# Patient Record
Sex: MALE | Race: OTHER | Employment: OTHER | URBAN - METROPOLITAN AREA
[De-identification: names, ages, dates, MRNs, and addresses within clinical notes are randomized per-mention and may not be internally consistent; named-entity substitution may affect disease eponyms.]

---

## 2017-01-04 ENCOUNTER — TELEPHONE (OUTPATIENT)
Dept: GASTROENTEROLOGY | Facility: CLINIC | Age: 72
End: 2017-01-04

## 2017-01-04 DIAGNOSIS — G47.00 INSOMNIA, UNSPECIFIED TYPE: Primary | ICD-10-CM

## 2017-01-04 RX ORDER — LORAZEPAM 2 MG/1
2 TABLET ORAL 2 TIMES DAILY PRN
Qty: 60 TABLET | Refills: 2 | Status: SHIPPED | OUTPATIENT
Start: 2017-01-04 | End: 2017-02-21

## 2017-01-04 NOTE — TELEPHONE ENCOUNTER
Vitaly Bass @ [facility] notified to cancel colon & EGD for 1/6. Removed from EMR    FYI sent to [facility].   Pt has rescheduled many times since OV 7/7/16

## 2017-01-04 NOTE — TELEPHONE ENCOUNTER
Pt. Wants to cancel his procedure. He does not want to reschedule at this time. He states that he will call back.

## 2017-01-10 DIAGNOSIS — G89.29 CHRONIC MIDLINE LOW BACK PAIN WITHOUT SCIATICA: Primary | ICD-10-CM

## 2017-01-10 DIAGNOSIS — M54.50 CHRONIC MIDLINE LOW BACK PAIN WITHOUT SCIATICA: Primary | ICD-10-CM

## 2017-01-11 RX ORDER — HYDROCODONE BITARTRATE AND ACETAMINOPHEN 5; 325 MG/1; MG/1
1 TABLET ORAL EVERY 6 HOURS PRN
Qty: 90 TABLET | Refills: 0 | Status: SHIPPED | OUTPATIENT
Start: 2017-01-11 | End: 2017-02-10

## 2017-01-26 DIAGNOSIS — Z00.00 PHYSICAL EXAM: ICD-10-CM

## 2017-01-26 DIAGNOSIS — Z00.00 ENCOUNTER FOR ANNUAL HEALTH EXAMINATION: Primary | ICD-10-CM

## 2017-01-27 RX ORDER — ASPIRIN 81 MG/1
81 TABLET, CHEWABLE ORAL DAILY
Qty: 30 TABLET | Refills: 2 | Status: SHIPPED | OUTPATIENT
Start: 2017-01-27 | End: 2018-04-13

## 2017-01-31 ENCOUNTER — TELEPHONE (OUTPATIENT)
Dept: INTERNAL MEDICINE CLINIC | Facility: CLINIC | Age: 72
End: 2017-01-31

## 2017-01-31 ENCOUNTER — OFFICE VISIT (OUTPATIENT)
Dept: INTERNAL MEDICINE CLINIC | Facility: CLINIC | Age: 72
End: 2017-01-31

## 2017-01-31 VITALS
WEIGHT: 136 LBS | DIASTOLIC BLOOD PRESSURE: 80 MMHG | HEIGHT: 63.5 IN | TEMPERATURE: 99 F | HEART RATE: 100 BPM | OXYGEN SATURATION: 98 % | BODY MASS INDEX: 23.8 KG/M2 | SYSTOLIC BLOOD PRESSURE: 126 MMHG

## 2017-01-31 DIAGNOSIS — N18.31 CHRONIC KIDNEY DISEASE (CKD) STAGE G3A/A1, MODERATELY DECREASED GLOMERULAR FILTRATION RATE (GFR) BETWEEN 45-59 ML/MIN/1.73 SQUARE METER AND ALBUMINURIA CREATININE RATIO LESS THAN 30 MG/G (HCC): ICD-10-CM

## 2017-01-31 DIAGNOSIS — M10.371 ACUTE GOUT DUE TO RENAL IMPAIRMENT INVOLVING TOE OF RIGHT FOOT: Primary | ICD-10-CM

## 2017-01-31 PROCEDURE — 99213 OFFICE O/P EST LOW 20 MIN: CPT | Performed by: FAMILY MEDICINE

## 2017-01-31 RX ORDER — PREDNISONE 20 MG/1
20 TABLET ORAL DAILY
Qty: 7 TABLET | Refills: 1 | Status: SHIPPED | OUTPATIENT
Start: 2017-01-31 | End: 2017-02-07

## 2017-01-31 RX ORDER — HYDROCODONE BITARTRATE AND ACETAMINOPHEN 7.5; 325 MG/1; MG/1
1 TABLET ORAL EVERY 6 HOURS PRN
Qty: 30 TABLET | Refills: 1 | Status: SHIPPED | OUTPATIENT
Start: 2017-01-31 | End: 2017-02-20

## 2017-01-31 RX ORDER — COLCHICINE 0.6 MG/1
0.6 TABLET ORAL 2 TIMES DAILY
Qty: 14 TABLET | Refills: 1 | Status: SHIPPED | OUTPATIENT
Start: 2017-01-31 | End: 2017-02-07

## 2017-01-31 NOTE — PROGRESS NOTES
CC:  Gout      Hx of CC:  3 DAYS WITH SEVERE RIGHT TOE/ANTERIOR FOOT PAIN.   GETS \"GOUTY\" ATTACKS ABOUT EVERY 8 MONTHS OR SO.    Vitals:    01/31/17  1148   BP: 126/80   Pulse: 100   Temp: 98.6 °F (37 °C)   TempSrc: Oral   Height: 63.5\"   Weight: 136 lb

## 2017-01-31 NOTE — PATIENT INSTRUCTIONS
TAKE PRESCRIBED MEDS X 1 WEEK.   IN THE FUTURE CONSIDER ALLOPURINOL/COLCHICINE DAILY TO LOWER URIC ACID LEVELS

## 2017-01-31 NOTE — TELEPHONE ENCOUNTER
Pt would like a refill on his LORazepam (ATIVAN) 2 MG Oral Tab. Pt says he is experiencing swelling and pain from his gout and would also like a prescription. Preferred contact is [phone]. Preferred pharmacy is [address].  [employer]

## 2017-02-09 ENCOUNTER — OFFICE VISIT (OUTPATIENT)
Dept: PODIATRY CLINIC | Facility: CLINIC | Age: 72
End: 2017-02-09

## 2017-02-09 DIAGNOSIS — B35.1 ONYCHOMYCOSIS: ICD-10-CM

## 2017-02-09 DIAGNOSIS — M79.675 PAIN IN TOES OF BOTH FEET: Primary | ICD-10-CM

## 2017-02-09 DIAGNOSIS — M79.674 PAIN IN TOES OF BOTH FEET: Primary | ICD-10-CM

## 2017-02-09 PROCEDURE — G0463 HOSPITAL OUTPT CLINIC VISIT: HCPCS | Performed by: PODIATRIST

## 2017-02-09 PROCEDURE — 99212 OFFICE O/P EST SF 10 MIN: CPT | Performed by: PODIATRIST

## 2017-02-09 NOTE — PROGRESS NOTES
HPI:    Patient ID: Kait Chopra is a 74year old male. HPI  This pleasant 77-year-old male presents with a new complaint. His concern is associated with both of his great toenails.   He states that they have been thick and hard to manage and caus are normal.  All lesser toenails appear to be quite normal.  There is minor dystrophy but properly cared for the right great toenail has marked thickness and dystrophy changes that are clearly associated with long-standing chronic mycosis.   There is palpab

## 2017-02-15 DIAGNOSIS — M10.371 ACUTE GOUT DUE TO RENAL IMPAIRMENT INVOLVING TOE OF RIGHT FOOT: Primary | ICD-10-CM

## 2017-02-15 RX ORDER — HYDROCODONE BITARTRATE AND ACETAMINOPHEN 7.5; 325 MG/1; MG/1
1 TABLET ORAL EVERY 6 HOURS PRN
Qty: 30 TABLET | Refills: 1 | OUTPATIENT
Start: 2017-02-15

## 2017-02-15 NOTE — TELEPHONE ENCOUNTER
Can you please see if he is out of norco already? Was given 30 on 1/31/17 and one refill?  Usually doesn't go through it this fast. TY

## 2017-02-17 NOTE — TELEPHONE ENCOUNTER
Patient walked in to  script I informed script was not approved because he just received 30 tablets on 01/31/2017.  Per patient he was taking medication as prescribed every 6 hours states he was taking 3 a day because of his gout pain and is now out

## 2017-02-20 DIAGNOSIS — M10.371 ACUTE GOUT DUE TO RENAL IMPAIRMENT INVOLVING TOE OF RIGHT FOOT: Primary | ICD-10-CM

## 2017-02-21 ENCOUNTER — OFFICE VISIT (OUTPATIENT)
Dept: INTERNAL MEDICINE CLINIC | Facility: CLINIC | Age: 72
End: 2017-02-21

## 2017-02-21 ENCOUNTER — TELEPHONE (OUTPATIENT)
Dept: INTERNAL MEDICINE CLINIC | Facility: CLINIC | Age: 72
End: 2017-02-21

## 2017-02-21 ENCOUNTER — TELEPHONE (OUTPATIENT)
Dept: PODIATRY CLINIC | Facility: CLINIC | Age: 72
End: 2017-02-21

## 2017-02-21 VITALS
RESPIRATION RATE: 12 BRPM | HEART RATE: 65 BPM | OXYGEN SATURATION: 97 % | TEMPERATURE: 98 F | WEIGHT: 138 LBS | DIASTOLIC BLOOD PRESSURE: 80 MMHG | SYSTOLIC BLOOD PRESSURE: 136 MMHG | BODY MASS INDEX: 24.15 KG/M2 | HEIGHT: 63.5 IN

## 2017-02-21 DIAGNOSIS — I10 ESSENTIAL HYPERTENSION WITH GOAL BLOOD PRESSURE LESS THAN 140/90: Primary | ICD-10-CM

## 2017-02-21 DIAGNOSIS — G47.00 INSOMNIA, UNSPECIFIED TYPE: ICD-10-CM

## 2017-02-21 DIAGNOSIS — Z12.11 SCREENING FOR COLON CANCER: ICD-10-CM

## 2017-02-21 DIAGNOSIS — M54.50 CHRONIC MIDLINE LOW BACK PAIN WITHOUT SCIATICA: ICD-10-CM

## 2017-02-21 DIAGNOSIS — I10 ESSENTIAL HYPERTENSION: ICD-10-CM

## 2017-02-21 DIAGNOSIS — F41.9 ANXIETY: ICD-10-CM

## 2017-02-21 DIAGNOSIS — G89.29 CHRONIC MIDLINE LOW BACK PAIN WITHOUT SCIATICA: ICD-10-CM

## 2017-02-21 DIAGNOSIS — E03.9 HYPOTHYROIDISM, UNSPECIFIED TYPE: Primary | ICD-10-CM

## 2017-02-21 DIAGNOSIS — M54.2 NECK PAIN: ICD-10-CM

## 2017-02-21 LAB
ALBUMIN SERPL BCP-MCNC: 4.1 G/DL (ref 3.5–4.8)
ALBUMIN/GLOB SERPL: 1.5 {RATIO} (ref 1–2)
ALP SERPL-CCNC: 79 U/L (ref 32–100)
ALT SERPL-CCNC: 18 U/L (ref 17–63)
ANION GAP SERPL CALC-SCNC: 11 MMOL/L (ref 0–18)
AST SERPL-CCNC: 27 U/L (ref 15–41)
BILIRUB SERPL-MCNC: 1.1 MG/DL (ref 0.3–1.2)
BUN SERPL-MCNC: 14 MG/DL (ref 8–20)
BUN/CREAT SERPL: 10.9 (ref 10–20)
CALCIUM SERPL-MCNC: 9.5 MG/DL (ref 8.5–10.5)
CHLORIDE SERPL-SCNC: 101 MMOL/L (ref 95–110)
CO2 SERPL-SCNC: 25 MMOL/L (ref 22–32)
CREAT SERPL-MCNC: 1.28 MG/DL (ref 0.5–1.5)
GLOBULIN PLAS-MCNC: 2.7 G/DL (ref 2.5–3.7)
GLUCOSE SERPL-MCNC: 76 MG/DL (ref 70–99)
OSMOLALITY UR CALC.SUM OF ELEC: 283 MOSM/KG (ref 275–295)
POTASSIUM SERPL-SCNC: 4.4 MMOL/L (ref 3.3–5.1)
PROT SERPL-MCNC: 6.8 G/DL (ref 5.9–8.4)
SODIUM SERPL-SCNC: 137 MMOL/L (ref 136–144)
TSH SERPL-ACNC: 0.98 UIU/ML (ref 0.34–5.6)

## 2017-02-21 PROCEDURE — 99214 OFFICE O/P EST MOD 30 MIN: CPT | Performed by: FAMILY MEDICINE

## 2017-02-21 PROCEDURE — 84443 ASSAY THYROID STIM HORMONE: CPT | Performed by: FAMILY MEDICINE

## 2017-02-21 PROCEDURE — 80053 COMPREHEN METABOLIC PANEL: CPT | Performed by: FAMILY MEDICINE

## 2017-02-21 RX ORDER — METOPROLOL SUCCINATE 50 MG/1
50 TABLET, EXTENDED RELEASE ORAL DAILY
Qty: 90 TABLET | Refills: 1 | Status: SHIPPED | OUTPATIENT
Start: 2017-02-21 | End: 2017-03-07

## 2017-02-21 RX ORDER — HYDROCODONE BITARTRATE AND ACETAMINOPHEN 7.5; 325 MG/1; MG/1
1 TABLET ORAL EVERY 6 HOURS PRN
Qty: 60 TABLET | Refills: 1 | Status: SHIPPED | OUTPATIENT
Start: 2017-02-21 | End: 2017-03-16

## 2017-02-21 RX ORDER — METOPROLOL SUCCINATE 50 MG/1
50 TABLET, EXTENDED RELEASE ORAL DAILY
Qty: 90 TABLET | Refills: 1 | Status: CANCELLED | OUTPATIENT
Start: 2017-02-21

## 2017-02-21 RX ORDER — LISINOPRIL 20 MG/1
20 TABLET ORAL DAILY
Qty: 90 TABLET | Refills: 1 | Status: SHIPPED | OUTPATIENT
Start: 2017-02-21 | End: 2017-04-24

## 2017-02-21 RX ORDER — LORAZEPAM 2 MG/1
2 TABLET ORAL 2 TIMES DAILY PRN
Qty: 60 TABLET | Refills: 2 | Status: SHIPPED | OUTPATIENT
Start: 2017-02-21 | End: 2017-05-15 | Stop reason: ALTCHOICE

## 2017-02-21 RX ORDER — LORAZEPAM 2 MG/1
2 TABLET ORAL 2 TIMES DAILY PRN
Qty: 60 TABLET | Refills: 2 | Status: CANCELLED | OUTPATIENT
Start: 2017-02-21

## 2017-02-21 RX ORDER — LISINOPRIL 20 MG/1
20 TABLET ORAL DAILY
Qty: 90 TABLET | Refills: 1 | Status: CANCELLED | OUTPATIENT
Start: 2017-02-21
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
## 2017-02-21 NOTE — PATIENT INSTRUCTIONS
Would like you to get colonoscopy    Tylenol for back pain and norco only if needed    Start zoloft- 1/2 tablet for one week, then one tablet daily.  Can take at night or morning    Will also get MRI of back      Follow up 1 week

## 2017-02-27 ENCOUNTER — OFFICE VISIT (OUTPATIENT)
Dept: PODIATRY CLINIC | Facility: CLINIC | Age: 72
End: 2017-02-27

## 2017-02-27 DIAGNOSIS — M79.675 PAIN IN TOES OF BOTH FEET: ICD-10-CM

## 2017-02-27 DIAGNOSIS — M79.674 PAIN IN TOES OF BOTH FEET: ICD-10-CM

## 2017-02-27 DIAGNOSIS — B35.1 ONYCHOMYCOSIS: Primary | ICD-10-CM

## 2017-02-27 PROCEDURE — 11750 EXCISION NAIL&NAIL MATRIX: CPT | Performed by: PODIATRIST

## 2017-02-27 NOTE — PROGRESS NOTES
HPI:    Patient ID: Maritza Newman is a 74year old male. HPI  64-year-old male presents for nail removal right great toe. I reviewed the procedure the postoperative course the potential concerns and complications.   This is a total nail removal on instructions         ASSESSMENT/PLAN:   Onychomycosis  (primary encounter diagnosis)  Pain in toes of both feet    No orders of the defined types were placed in this encounter.        Meds This Visit:  No prescriptions requested or ordered in this encounter

## 2017-02-27 NOTE — PROGRESS NOTES
Per verbal order from [facility], draw up 1.5ml of 0.5% Marcaine and 1.5ml of 2% lidocaine for injection to right great toe nail .  Consent signed for release/removal right great toenail Leonor Grady RN

## 2017-03-07 ENCOUNTER — OFFICE VISIT (OUTPATIENT)
Dept: PODIATRY CLINIC | Facility: CLINIC | Age: 72
End: 2017-03-07

## 2017-03-07 DIAGNOSIS — B35.1 ONYCHOMYCOSIS: Primary | ICD-10-CM

## 2017-03-07 PROCEDURE — G0463 HOSPITAL OUTPT CLINIC VISIT: HCPCS | Performed by: PODIATRIST

## 2017-03-07 PROCEDURE — 99024 POSTOP FOLLOW-UP VISIT: CPT | Performed by: PODIATRIST

## 2017-03-15 ENCOUNTER — HOSPITAL ENCOUNTER (OUTPATIENT)
Dept: MRI IMAGING | Facility: HOSPITAL | Age: 72
Discharge: HOME | End: 2017-03-15
Attending: FAMILY MEDICINE
Payer: MEDICARE

## 2017-03-15 DIAGNOSIS — G89.29 CHRONIC MIDLINE LOW BACK PAIN WITHOUT SCIATICA: ICD-10-CM

## 2017-03-15 DIAGNOSIS — M54.50 CHRONIC MIDLINE LOW BACK PAIN WITHOUT SCIATICA: ICD-10-CM

## 2017-03-15 PROCEDURE — 72148 MRI LUMBAR SPINE W/O DYE: CPT

## 2017-03-15 PROCEDURE — 72158 MRI LUMBAR SPINE W/O & W/DYE: CPT

## 2017-03-16 ENCOUNTER — TELEPHONE (OUTPATIENT)
Dept: INTERNAL MEDICINE CLINIC | Facility: CLINIC | Age: 72
End: 2017-03-16

## 2017-03-17 NOTE — PROGRESS NOTES
Quick Note:    D/w pt over phone. Recommend PT. Is taking norco for pain prn. Discussed physiatry referral today but pt prefers to wait.  Will readdress at next visit.  ______

## 2017-03-21 DIAGNOSIS — G47.00 INSOMNIA, UNSPECIFIED TYPE: Primary | ICD-10-CM

## 2017-03-22 ENCOUNTER — OFFICE VISIT (OUTPATIENT)
Dept: INTERNAL MEDICINE CLINIC | Facility: CLINIC | Age: 72
End: 2017-03-22

## 2017-03-22 ENCOUNTER — OFFICE VISIT (OUTPATIENT)
Dept: PODIATRY CLINIC | Facility: CLINIC | Age: 72
End: 2017-03-22

## 2017-03-22 VITALS
HEIGHT: 63.5 IN | BODY MASS INDEX: 25.2 KG/M2 | SYSTOLIC BLOOD PRESSURE: 126 MMHG | HEART RATE: 74 BPM | WEIGHT: 144 LBS | RESPIRATION RATE: 16 BRPM | OXYGEN SATURATION: 97 % | DIASTOLIC BLOOD PRESSURE: 80 MMHG

## 2017-03-22 DIAGNOSIS — M51.26 HERNIATED INTERVERTEBRAL DISC OF LUMBAR SPINE: Primary | ICD-10-CM

## 2017-03-22 DIAGNOSIS — M54.42 CHRONIC LEFT-SIDED LOW BACK PAIN WITH LEFT-SIDED SCIATICA: ICD-10-CM

## 2017-03-22 DIAGNOSIS — G89.29 CHRONIC LEFT-SIDED LOW BACK PAIN WITH LEFT-SIDED SCIATICA: ICD-10-CM

## 2017-03-22 DIAGNOSIS — K13.0 ANGULAR CHEILITIS: ICD-10-CM

## 2017-03-22 DIAGNOSIS — B35.1 ONYCHOMYCOSIS: Primary | ICD-10-CM

## 2017-03-22 DIAGNOSIS — F41.9 ANXIETY: ICD-10-CM

## 2017-03-22 PROCEDURE — 99212 OFFICE O/P EST SF 10 MIN: CPT | Performed by: PODIATRIST

## 2017-03-22 PROCEDURE — 99213 OFFICE O/P EST LOW 20 MIN: CPT | Performed by: FAMILY MEDICINE

## 2017-03-22 PROCEDURE — G0463 HOSPITAL OUTPT CLINIC VISIT: HCPCS | Performed by: PODIATRIST

## 2017-03-22 RX ORDER — LORAZEPAM 2 MG/1
1 TABLET ORAL 2 TIMES DAILY PRN
Qty: 60 TABLET | Refills: 1 | Status: SHIPPED | OUTPATIENT
Start: 2017-03-22 | End: 2017-05-15 | Stop reason: ALTCHOICE

## 2017-03-22 NOTE — PROGRESS NOTES
HPI:    Patient ID: Belia Bond is a 74year old male. HPI  This 40-year-old male presents for follow-up in reference to right great toenail removal.  A total onycho plasty was performed more than 3 weeks ago.   Patient reports no noted complaints evidence of edema nor erythema there is no odor. Minor debridement was accomplished today and I am pleased with the present status. I encourage continued daily soaking and cleansing and application of a Band-Aid.   He tells me he is soaking with vinegar a

## 2017-03-22 NOTE — PROGRESS NOTES
CC:  Test Results      Hx of CC:    Here for results MRI LS. Pt has had chronic back pain. Worse on and off depending on how much he is working. Works as  and cleans gutters. Very active.   MRI shows L3-L4 disc bulge with nerve irritation on le Grandfather    • Kevin Carbone Paternal Grandmother    • Other[other] [OTHER] Paternal Grandfather         Relation Status Death Age Comments   Fa      Mo      MGMA      MGFA      [address] [facility]      PGFA  cheilitis  vasoline daily, increase fluids  - Clotrimazole 1 % External Ointment; Apply 1 Application topically 2 (two) times daily as needed.   Dispense: 1 Tube; Refill: 0  - triamcinolone acetonide 0.1 % External Cream; Apply topically 2 (two) times daily

## 2017-03-23 RX ORDER — LORAZEPAM 2 MG/1
2 TABLET ORAL 2 TIMES DAILY PRN
Qty: 60 TABLET | Refills: 0 | OUTPATIENT
Start: 2017-03-23

## 2017-04-17 ENCOUNTER — OFFICE VISIT (OUTPATIENT)
Dept: INTERNAL MEDICINE CLINIC | Facility: CLINIC | Age: 72
End: 2017-04-17

## 2017-04-17 VITALS
SYSTOLIC BLOOD PRESSURE: 128 MMHG | TEMPERATURE: 98 F | DIASTOLIC BLOOD PRESSURE: 78 MMHG | BODY MASS INDEX: 25 KG/M2 | WEIGHT: 142.81 LBS | HEART RATE: 75 BPM | OXYGEN SATURATION: 99 %

## 2017-04-17 DIAGNOSIS — G89.29 CHRONIC LEFT-SIDED LOW BACK PAIN WITH LEFT-SIDED SCIATICA: ICD-10-CM

## 2017-04-17 DIAGNOSIS — M54.42 CHRONIC LEFT-SIDED LOW BACK PAIN WITH LEFT-SIDED SCIATICA: ICD-10-CM

## 2017-04-17 DIAGNOSIS — T14.8XXA BRUISE: Primary | ICD-10-CM

## 2017-04-17 DIAGNOSIS — G47.00 INSOMNIA, UNSPECIFIED TYPE: ICD-10-CM

## 2017-04-17 PROCEDURE — 99214 OFFICE O/P EST MOD 30 MIN: CPT | Performed by: FAMILY MEDICINE

## 2017-04-17 PROCEDURE — 85025 COMPLETE CBC W/AUTO DIFF WBC: CPT | Performed by: FAMILY MEDICINE

## 2017-04-17 RX ORDER — LORAZEPAM 2 MG/1
2 TABLET ORAL 2 TIMES DAILY PRN
Qty: 60 TABLET | Refills: 2 | Status: SHIPPED | OUTPATIENT
Start: 2017-04-17 | End: 2017-08-03

## 2017-04-17 RX ORDER — HYDROCODONE BITARTRATE AND ACETAMINOPHEN 7.5; 325 MG/1; MG/1
1 TABLET ORAL EVERY 6 HOURS PRN
Qty: 90 TABLET | Refills: 0 | Status: SHIPPED | OUTPATIENT
Start: 2017-04-17 | End: 2017-05-17
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
## 2017-04-20 ENCOUNTER — TELEPHONE (OUTPATIENT)
Dept: GASTROENTEROLOGY | Facility: CLINIC | Age: 72
End: 2017-04-20

## 2017-04-20 NOTE — TELEPHONE ENCOUNTER
Pt had consultation 7/7/16 for colonoscopy- would like to scheduled procedure- pl call pt- pt st he is not having any GI problems

## 2017-04-20 NOTE — TELEPHONE ENCOUNTER
Pt had rescheduled 4-5 times since July. Please call pt to reschedule screening colonoscopy.   Routed to [place],     Venkat Chavez RN at 7/7/2016 10:28 AM      Status: Signed : Venkat Chavez RN (Registered Nurse)     Expand All Collapse

## 2017-04-24 RX ORDER — LISINOPRIL 20 MG/1
TABLET ORAL
Qty: 90 TABLET | Refills: 0 | Status: SHIPPED | OUTPATIENT
Start: 2017-04-24 | End: 2017-04-25

## 2017-04-24 RX ORDER — METOPROLOL SUCCINATE 50 MG/1
TABLET, EXTENDED RELEASE ORAL
Qty: 90 TABLET | Refills: 0 | Status: SHIPPED | OUTPATIENT
Start: 2017-04-24 | End: 2017-09-21

## 2017-04-24 RX ORDER — LISINOPRIL 20 MG/1
TABLET ORAL
Qty: 90 TABLET | Refills: 0 | Status: SHIPPED | OUTPATIENT
Start: 2017-04-24 | End: 2017-09-21

## 2017-04-24 RX ORDER — LEVOTHYROXINE SODIUM 137 UG/1
TABLET ORAL
Qty: 30 TABLET | Refills: 4 | Status: SHIPPED | OUTPATIENT
Start: 2017-04-24 | End: 2017-09-20

## 2017-04-25 RX ORDER — LISINOPRIL 20 MG/1
TABLET ORAL
Qty: 90 TABLET | Refills: 0 | Status: SHIPPED | OUTPATIENT
Start: 2017-04-25 | End: 2017-05-15 | Stop reason: ALTCHOICE

## 2017-04-26 NOTE — TELEPHONE ENCOUNTER
Scheduled for:  Colonoscopy [phone] and EGD [id number]  Provider Name:  Dr. Summer Mendez  Date:  5/23/17  Location:  [facility]  Sedation:  IV  Time:  1400 (pt aware to arrive at 1300)  Prep:  Miralax/Gatorade, mailed 4/26/17   Meds/Allergies Reconciled?:  Yes  Diagnosis with cod

## 2017-05-15 ENCOUNTER — OFFICE VISIT (OUTPATIENT)
Dept: INTERNAL MEDICINE CLINIC | Facility: CLINIC | Age: 72
End: 2017-05-15

## 2017-05-15 VITALS
SYSTOLIC BLOOD PRESSURE: 122 MMHG | DIASTOLIC BLOOD PRESSURE: 80 MMHG | HEART RATE: 72 BPM | BODY MASS INDEX: 25.2 KG/M2 | HEIGHT: 63.5 IN | OXYGEN SATURATION: 97 % | WEIGHT: 144 LBS

## 2017-05-15 DIAGNOSIS — F41.9 ANXIETY: ICD-10-CM

## 2017-05-15 DIAGNOSIS — S29.011A CHEST WALL MUSCLE STRAIN, INITIAL ENCOUNTER: ICD-10-CM

## 2017-05-15 DIAGNOSIS — M54.50 ACUTE LOW BACK PAIN WITHOUT SCIATICA, UNSPECIFIED BACK PAIN LATERALITY: ICD-10-CM

## 2017-05-15 DIAGNOSIS — M51.26 HERNIATED LUMBAR INTERVERTEBRAL DISC: ICD-10-CM

## 2017-05-15 DIAGNOSIS — R07.9 CHEST PAIN, UNSPECIFIED TYPE: Primary | ICD-10-CM

## 2017-05-15 PROCEDURE — 99214 OFFICE O/P EST MOD 30 MIN: CPT | Performed by: FAMILY MEDICINE

## 2017-05-15 PROCEDURE — 93000 ELECTROCARDIOGRAM COMPLETE: CPT | Performed by: FAMILY MEDICINE

## 2017-05-15 RX ORDER — HYDROCODONE BITARTRATE AND ACETAMINOPHEN 7.5; 325 MG/1; MG/1
1 TABLET ORAL EVERY 8 HOURS PRN
Qty: 90 TABLET | Refills: 0 | Status: SHIPPED | OUTPATIENT
Start: 2017-05-15 | End: 2017-06-12

## 2017-05-15 RX ORDER — IBUPROFEN 600 MG/1
600 TABLET ORAL EVERY 6 HOURS PRN
Qty: 30 TABLET | Refills: 1 | Status: SHIPPED | OUTPATIENT
Start: 2017-05-15 | End: 2017-09-21

## 2017-05-15 NOTE — PROGRESS NOTES
Adriana Cordova is a 74year old male. CC:  Patient presents with:  Chest Pressure: Pt presents to clinic for c/o right sided chest pressure with movement-->pt states pain started after lifting heavy supplies.         HPI:    Pt c/o right sided chest Unspecified essential hypertension    • Allergic rhinitis    • Hypothyroid    • Alopecia areata    • Esophageal reflux       No past surgical history on file.    Family History   Problem Relation Age of Onset   • other[other] [OTHER] Father      unknown cau Alert and oriented x 3, affect appropriate  SKIN: No rashes, no lesions  EXTREMITIES:  Bilaterally warm, no edema, no rashes  LYMPH:  No cervical lymphadenopathy  MUSCULOSKELETAL: Normal ambulation and movement  NEURO: A & O x 3, no focal deficits    EKG:

## 2017-06-12 DIAGNOSIS — M54.50 ACUTE LOW BACK PAIN WITHOUT SCIATICA, UNSPECIFIED BACK PAIN LATERALITY: Primary | ICD-10-CM

## 2017-06-12 RX ORDER — HYDROCODONE BITARTRATE AND ACETAMINOPHEN 7.5; 325 MG/1; MG/1
1 TABLET ORAL EVERY 8 HOURS PRN
Qty: 90 TABLET | Refills: 0 | Status: SHIPPED | OUTPATIENT
Start: 2017-06-12 | End: 2017-07-06

## 2017-06-22 ENCOUNTER — OFFICE VISIT (OUTPATIENT)
Dept: INTERNAL MEDICINE CLINIC | Facility: CLINIC | Age: 72
End: 2017-06-22

## 2017-06-22 VITALS
SYSTOLIC BLOOD PRESSURE: 144 MMHG | DIASTOLIC BLOOD PRESSURE: 86 MMHG | OXYGEN SATURATION: 97 % | WEIGHT: 147 LBS | BODY MASS INDEX: 25.72 KG/M2 | HEART RATE: 61 BPM | RESPIRATION RATE: 17 BRPM | HEIGHT: 63.5 IN

## 2017-06-22 DIAGNOSIS — S46.212A BICEPS STRAIN, LEFT, INITIAL ENCOUNTER: ICD-10-CM

## 2017-06-22 DIAGNOSIS — I10 ESSENTIAL HYPERTENSION: Primary | ICD-10-CM

## 2017-06-22 DIAGNOSIS — F41.9 ANXIETY: ICD-10-CM

## 2017-06-22 DIAGNOSIS — M54.50 ACUTE LEFT-SIDED LOW BACK PAIN WITHOUT SCIATICA: ICD-10-CM

## 2017-06-22 DIAGNOSIS — D64.9 ANEMIA, UNSPECIFIED TYPE: ICD-10-CM

## 2017-06-22 DIAGNOSIS — M25.512 ACUTE PAIN OF LEFT SHOULDER: ICD-10-CM

## 2017-06-22 PROCEDURE — 99214 OFFICE O/P EST MOD 30 MIN: CPT | Performed by: FAMILY MEDICINE

## 2017-06-22 RX ORDER — LISINOPRIL AND HYDROCHLOROTHIAZIDE 25; 20 MG/1; MG/1
1 TABLET ORAL DAILY
Qty: 30 TABLET | Refills: 1 | Status: SHIPPED | OUTPATIENT
Start: 2017-06-22 | End: 2017-08-25

## 2017-06-22 RX ORDER — NAPROXEN 500 MG/1
500 TABLET ORAL 2 TIMES DAILY WITH MEALS
Qty: 30 TABLET | Refills: 1 | Status: SHIPPED | OUTPATIENT
Start: 2017-06-22 | End: 2017-09-21

## 2017-06-22 NOTE — PATIENT INSTRUCTIONS
Please sched physical therapy and colonoscopy    Break from lifting weights x 1 month , do therapy instead    Follow up 1-2 mos , return for full physical and BP recheck    Low salt diet

## 2017-06-22 NOTE — PROGRESS NOTES
Sowmya Zuniga is a 74year old male. CC:  Patient presents with: Follow - Up: Pt presents to clinic for 2 month f/up and review of meds. Pt also c/o of mild left arm/shoulder pain. Other: Depression screen and fassl risk done.  Will schedule for p tablet Rfl: 2        History:  Past Medical History   Diagnosis Date   • Unspecified essential hypertension    • Allergic rhinitis    • Hypothyroid    • Alopecia areata    • Esophageal reflux       No past surgical history on file.    Family History   Probl guarding.   PSYCH:  Alert and oriented x 3, affect appropriate  SKIN: No rashes, no lesions  EXTREMITIES:  Bilaterally warm, no edema, no rashes  LYMPH:  No cervical lymphadenopathy  MUSCULOSKELETAL: Normal ambulation and movement left shoulder- + ttp anter

## 2017-06-24 ENCOUNTER — HOSPITAL ENCOUNTER (OUTPATIENT)
Dept: GENERAL RADIOLOGY | Facility: HOSPITAL | Age: 72
Discharge: HOME | End: 2017-06-24
Attending: FAMILY MEDICINE
Payer: MEDICARE

## 2017-06-24 DIAGNOSIS — F41.9 ANXIETY: ICD-10-CM

## 2017-06-24 DIAGNOSIS — M25.512 ACUTE PAIN OF LEFT SHOULDER: ICD-10-CM

## 2017-06-24 PROCEDURE — 73030 X-RAY EXAM OF SHOULDER: CPT | Performed by: FAMILY MEDICINE

## 2017-07-03 ENCOUNTER — OFFICE VISIT (OUTPATIENT)
Dept: PHYSICAL THERAPY | Facility: HOSPITAL | Age: 72
End: 2017-07-03
Attending: FAMILY MEDICINE
Payer: MEDICARE

## 2017-07-03 DIAGNOSIS — M25.512 ACUTE PAIN OF LEFT SHOULDER: ICD-10-CM

## 2017-07-03 DIAGNOSIS — S46.212A BICEPS STRAIN, LEFT, INITIAL ENCOUNTER: ICD-10-CM

## 2017-07-03 PROCEDURE — 97162 PT EVAL MOD COMPLEX 30 MIN: CPT

## 2017-07-03 PROCEDURE — 97110 THERAPEUTIC EXERCISES: CPT

## 2017-07-03 NOTE — PROGRESS NOTES
SHOULDER EVALUATION:   Referring Physician: Dr. Marta Sullivan  Diagnosis: L shoulder pain Date of Service: 7/3/2017     PATIENT SUMMARY:   Maggie Trevino is a 74year old y/o male who presents to therapy today with complaints of L shoulder pain.   Pt describes shoulders  Palpation: TTP in L bicep tendon  Sensation: intact to soft touch  Scapulohumeral Rhythm: decrease    AROM:  Shoulder    Flexion: R 150; L 150*  Abduction: R 135; L 146   Behind back reach: R=L     PROM:  Shoulder    Flexion: L 160 deg  Abductio Therapy; Therapeutic Exercises; Neuromuscular Re-education; Therapeutic Activity; Electrical Stim; Ultrasound;  Patient education; Home exercise program instruction    Education or treatment limitation: None  Rehab Potential: good    FOTO: not captured  Cur

## 2017-07-06 ENCOUNTER — OFFICE VISIT (OUTPATIENT)
Dept: PHYSICAL THERAPY | Facility: HOSPITAL | Age: 72
End: 2017-07-06
Attending: FAMILY MEDICINE
Payer: MEDICARE

## 2017-07-06 DIAGNOSIS — S46.212A BICEPS STRAIN, LEFT, INITIAL ENCOUNTER: ICD-10-CM

## 2017-07-06 DIAGNOSIS — M54.50 ACUTE LOW BACK PAIN WITHOUT SCIATICA, UNSPECIFIED BACK PAIN LATERALITY: ICD-10-CM

## 2017-07-06 DIAGNOSIS — M25.512 ACUTE PAIN OF LEFT SHOULDER: ICD-10-CM

## 2017-07-06 PROCEDURE — 97110 THERAPEUTIC EXERCISES: CPT

## 2017-07-06 NOTE — PROGRESS NOTES
Diagnosis: L shoulder pain  Authorized # of Visits:  2/10  MEDICARE       Next MD visit: none scheduled  Fall Risk: standard         Precautions: n/a           Medication Changes since last visit?: No  Subjective: \"I have pain with reaching across body, l

## 2017-07-07 ENCOUNTER — TELEPHONE (OUTPATIENT)
Dept: INTERNAL MEDICINE CLINIC | Facility: CLINIC | Age: 72
End: 2017-07-07

## 2017-07-07 RX ORDER — HYDROCODONE BITARTRATE AND ACETAMINOPHEN 7.5; 325 MG/1; MG/1
1 TABLET ORAL EVERY 8 HOURS PRN
Qty: 90 TABLET | Refills: 0 | Status: SHIPPED | OUTPATIENT
Start: 2017-07-07 | End: 2017-08-03

## 2017-07-10 ENCOUNTER — TELEPHONE (OUTPATIENT)
Dept: INTERNAL MEDICINE CLINIC | Facility: CLINIC | Age: 72
End: 2017-07-10

## 2017-07-25 ENCOUNTER — TELEPHONE (OUTPATIENT)
Dept: PHYSICAL THERAPY | Facility: HOSPITAL | Age: 72
End: 2017-07-25

## 2017-07-27 ENCOUNTER — TELEPHONE (OUTPATIENT)
Dept: PHYSICAL THERAPY | Facility: HOSPITAL | Age: 72
End: 2017-07-27

## 2017-07-30 ENCOUNTER — HOSPITAL ENCOUNTER (EMERGENCY)
Facility: HOSPITAL | Age: 72
Discharge: HOME | End: 2017-07-30
Attending: EMERGENCY MEDICINE
Payer: MEDICARE

## 2017-07-30 ENCOUNTER — APPOINTMENT (OUTPATIENT)
Dept: GENERAL RADIOLOGY | Facility: HOSPITAL | Age: 72
End: 2017-07-30
Payer: MEDICARE

## 2017-07-30 VITALS
HEIGHT: 65 IN | TEMPERATURE: 98 F | WEIGHT: 143 LBS | DIASTOLIC BLOOD PRESSURE: 71 MMHG | BODY MASS INDEX: 23.82 KG/M2 | OXYGEN SATURATION: 98 % | SYSTOLIC BLOOD PRESSURE: 120 MMHG | RESPIRATION RATE: 20 BRPM | HEART RATE: 79 BPM

## 2017-07-30 DIAGNOSIS — J40 BRONCHITIS: Primary | ICD-10-CM

## 2017-07-30 DIAGNOSIS — R91.1 LUNG NODULE: ICD-10-CM

## 2017-07-30 PROCEDURE — 71020 XR CHEST PA + LAT CHEST (CPT=71020): CPT | Performed by: EMERGENCY MEDICINE

## 2017-07-30 PROCEDURE — 99283 EMERGENCY DEPT VISIT LOW MDM: CPT

## 2017-07-30 RX ORDER — AZITHROMYCIN 250 MG/1
TABLET, FILM COATED ORAL
Qty: 1 PACKAGE | Refills: 0 | Status: SHIPPED | OUTPATIENT
Start: 2017-07-30 | End: 2017-08-03

## 2017-07-30 NOTE — ED INITIAL ASSESSMENT (HPI)
Pt states he has been coughing for 4 days now and it is beginning to get productive of greenish phlegm. He denies fever or shortness of breath. He was seen here a few years ago for the same and was prescribed a Z-pack.

## 2017-07-31 ENCOUNTER — TELEPHONE (OUTPATIENT)
Dept: PHYSICAL THERAPY | Facility: HOSPITAL | Age: 72
End: 2017-07-31

## 2017-07-31 NOTE — ED PROVIDER NOTES
Patient Seen in: [facility] Emergency Department    History   Patient presents with:  Cough      HPI    The patient presents complaining of a productive cough for the past 4 days. Initially mild, now more severe.   He denies shortness of breath, f palpitations. Gastrointestinal: Negative for abdominal pain and vomiting. Genitourinary: Negative for dysuria and hematuria. Musculoskeletal: Negative for back pain and neck pain. Skin: Negative for rash and wound.    Neurological: Negative for sync 07/30/17  1706   BP: 120/71   Pulse: 79   Resp: 20   Temp: 98 °F (36.7 °C)   TempSrc: Oral   SpO2: 98%   Weight: 64.9 kg   Height: 165.1 cm (5' 5\")     *I personally reviewed and interpreted all ED vitals.     Pulse Ox: 98%, Room air, Normal     Differenti these medications    azithromycin (ZITHROMAX Z-DENICE) 250 MG Oral Tab  500 mg once followed by 250 mg daily x 4 days, Script printed, Disp-1 Package, R-0

## 2017-08-02 ENCOUNTER — APPOINTMENT (OUTPATIENT)
Dept: PHYSICAL THERAPY | Facility: HOSPITAL | Age: 72
End: 2017-08-02
Attending: FAMILY MEDICINE
Payer: MEDICARE

## 2017-08-03 ENCOUNTER — OFFICE VISIT (OUTPATIENT)
Dept: INTERNAL MEDICINE CLINIC | Facility: CLINIC | Age: 72
End: 2017-08-03

## 2017-08-03 ENCOUNTER — TELEPHONE (OUTPATIENT)
Dept: INTERNAL MEDICINE CLINIC | Facility: CLINIC | Age: 72
End: 2017-08-03

## 2017-08-03 VITALS
HEIGHT: 63.5 IN | TEMPERATURE: 98 F | BODY MASS INDEX: 25.37 KG/M2 | WEIGHT: 145 LBS | OXYGEN SATURATION: 98 % | SYSTOLIC BLOOD PRESSURE: 126 MMHG | HEART RATE: 74 BPM | DIASTOLIC BLOOD PRESSURE: 70 MMHG

## 2017-08-03 DIAGNOSIS — R91.1 LUNG NODULE, SOLITARY: ICD-10-CM

## 2017-08-03 DIAGNOSIS — F41.9 ANXIETY: ICD-10-CM

## 2017-08-03 DIAGNOSIS — G47.00 INSOMNIA, UNSPECIFIED TYPE: ICD-10-CM

## 2017-08-03 DIAGNOSIS — M54.50 ACUTE LOW BACK PAIN WITHOUT SCIATICA, UNSPECIFIED BACK PAIN LATERALITY: ICD-10-CM

## 2017-08-03 DIAGNOSIS — J40 BRONCHITIS: Primary | ICD-10-CM

## 2017-08-03 PROCEDURE — 99213 OFFICE O/P EST LOW 20 MIN: CPT | Performed by: FAMILY MEDICINE

## 2017-08-03 RX ORDER — SERTRALINE HYDROCHLORIDE 100 MG/1
100 TABLET, FILM COATED ORAL DAILY
Qty: 30 TABLET | Refills: 0 | Status: SHIPPED | OUTPATIENT
Start: 2017-08-03 | End: 2017-09-18

## 2017-08-03 RX ORDER — LORAZEPAM 2 MG/1
2 TABLET ORAL 2 TIMES DAILY PRN
Qty: 60 TABLET | Refills: 2 | Status: SHIPPED | OUTPATIENT
Start: 2017-08-03 | End: 2017-08-30

## 2017-08-03 RX ORDER — HYDROCODONE BITARTRATE AND ACETAMINOPHEN 7.5; 325 MG/1; MG/1
1 TABLET ORAL EVERY 8 HOURS PRN
Qty: 90 TABLET | Refills: 0 | Status: SHIPPED | OUTPATIENT
Start: 2017-08-03 | End: 2017-08-30

## 2017-08-03 NOTE — PROGRESS NOTES
CC:  Bronchitis (Patient presents to clinic for bronchitis follow up)      Hx of CC:    Follow up ER visit 7/30- dx bronchitis but also 1 cm lung nodule found  Smoked 20 years- on avg 3 cig a day    Pt rx zpak and is feeling better. Feels great.      Terri Russell Alopecia areata    • Esophageal reflux    • Hypothyroid    • Unspecified essential hypertension       No past surgical history on file.    Family History   Problem Relation Age of Onset   • other[other] [OTHER] Father      unknown caused   • other[other] [O needed for Anxiety. Dispense: 60 tablet; Refill: 2    4.  Acute low back pain without sciatica, unspecified back pain laterality  Recent MRI shows L3-L4 disc bulge  Recommend tylenol if needed, norco if pain severe  Needs to resched appt w Dr Radha Tierney

## 2017-08-05 ENCOUNTER — TELEPHONE (OUTPATIENT)
Dept: INTERNAL MEDICINE CLINIC | Facility: CLINIC | Age: 72
End: 2017-08-05

## 2017-08-05 NOTE — TELEPHONE ENCOUNTER
Pt's  verified  Pt l/m on nurse line requesting a call back re: \"a medical problem\" and left a phone number no other details.

## 2017-08-05 NOTE — TELEPHONE ENCOUNTER
Patient states he still has a bacterial infections, wants to know if he should be on a 2nd round of Raydell Aniket.

## 2017-08-07 ENCOUNTER — APPOINTMENT (OUTPATIENT)
Dept: PHYSICAL THERAPY | Facility: HOSPITAL | Age: 72
End: 2017-08-07
Attending: FAMILY MEDICINE
Payer: MEDICARE

## 2017-08-14 ENCOUNTER — HOSPITAL ENCOUNTER (OUTPATIENT)
Dept: CT IMAGING | Facility: HOSPITAL | Age: 72
Discharge: HOME | End: 2017-08-14
Attending: FAMILY MEDICINE
Payer: MEDICARE

## 2017-08-14 DIAGNOSIS — R91.1 LUNG NODULE, SOLITARY: ICD-10-CM

## 2017-08-14 PROCEDURE — 71250 CT THORAX DX C-: CPT | Performed by: FAMILY MEDICINE

## 2017-08-15 PROBLEM — K21.9 GASTROESOPHAGEAL REFLUX DISEASE: Status: ACTIVE | Noted: 2017-08-15

## 2017-08-15 PROBLEM — R91.1 LUNG NODULE, SOLITARY: Status: ACTIVE | Noted: 2017-08-15

## 2017-08-15 NOTE — PROGRESS NOTES
Left VM for pt. Needs repeat CT lung in 12 mos. Also re hiatal hernia, pt does have reflux, needs to see GI for cscope, also recommend see for EGD and reflux.  Will mail new referral for GI

## 2017-08-25 DIAGNOSIS — I10 ESSENTIAL HYPERTENSION: ICD-10-CM

## 2017-08-25 DIAGNOSIS — M54.50 ACUTE LOW BACK PAIN WITHOUT SCIATICA, UNSPECIFIED BACK PAIN LATERALITY: ICD-10-CM

## 2017-08-27 RX ORDER — LISINOPRIL AND HYDROCHLOROTHIAZIDE 25; 20 MG/1; MG/1
1 TABLET ORAL DAILY
Qty: 30 TABLET | Refills: 5 | Status: SHIPPED | OUTPATIENT
Start: 2017-08-27 | End: 2017-08-28

## 2017-08-27 RX ORDER — HYDROCODONE BITARTRATE AND ACETAMINOPHEN 7.5; 325 MG/1; MG/1
1 TABLET ORAL EVERY 8 HOURS PRN
Qty: 90 TABLET | Refills: 0 | OUTPATIENT
Start: 2017-08-27 | End: 2017-09-26

## 2017-08-28 ENCOUNTER — TELEPHONE (OUTPATIENT)
Dept: INTERNAL MEDICINE CLINIC | Facility: CLINIC | Age: 72
End: 2017-08-28

## 2017-08-28 DIAGNOSIS — I10 ESSENTIAL HYPERTENSION: ICD-10-CM

## 2017-08-28 RX ORDER — LISINOPRIL AND HYDROCHLOROTHIAZIDE 25; 20 MG/1; MG/1
1 TABLET ORAL DAILY
Qty: 30 TABLET | Refills: 0 | Status: SHIPPED | OUTPATIENT
Start: 2017-08-28 | End: 2017-09-23

## 2017-08-30 ENCOUNTER — TELEPHONE (OUTPATIENT)
Dept: INTERNAL MEDICINE CLINIC | Facility: CLINIC | Age: 72
End: 2017-08-30

## 2017-08-30 PROBLEM — F41.9 ANXIETY: Status: ACTIVE | Noted: 2017-08-30

## 2017-08-30 PROBLEM — M54.50 CHRONIC BILATERAL LOW BACK PAIN WITHOUT SCIATICA: Status: ACTIVE | Noted: 2017-08-30

## 2017-08-30 PROBLEM — G89.29 CHRONIC BILATERAL LOW BACK PAIN WITHOUT SCIATICA: Status: ACTIVE | Noted: 2017-08-30

## 2017-09-13 DIAGNOSIS — I10 ESSENTIAL HYPERTENSION: ICD-10-CM

## 2017-09-20 RX ORDER — LEVOTHYROXINE SODIUM 137 UG/1
TABLET ORAL
Qty: 30 TABLET | Refills: 1 | Status: SHIPPED | OUTPATIENT
Start: 2017-09-20 | End: 2017-10-25

## 2017-09-21 ENCOUNTER — OFFICE VISIT (OUTPATIENT)
Dept: INTERNAL MEDICINE CLINIC | Facility: CLINIC | Age: 72
End: 2017-09-21

## 2017-09-21 VITALS
TEMPERATURE: 98 F | WEIGHT: 142 LBS | HEIGHT: 63.5 IN | HEART RATE: 68 BPM | BODY MASS INDEX: 24.85 KG/M2 | DIASTOLIC BLOOD PRESSURE: 70 MMHG | OXYGEN SATURATION: 98 % | SYSTOLIC BLOOD PRESSURE: 112 MMHG | RESPIRATION RATE: 12 BRPM

## 2017-09-21 DIAGNOSIS — Z12.11 SCREENING FOR COLON CANCER: ICD-10-CM

## 2017-09-21 DIAGNOSIS — M51.26 BULGING LUMBAR DISC: ICD-10-CM

## 2017-09-21 DIAGNOSIS — Z00.00 MEDICARE ANNUAL WELLNESS VISIT, SUBSEQUENT: Primary | ICD-10-CM

## 2017-09-21 DIAGNOSIS — Z23 NEED FOR INFLUENZA VACCINATION: ICD-10-CM

## 2017-09-21 PROCEDURE — G0008 ADMIN INFLUENZA VIRUS VAC: HCPCS | Performed by: FAMILY MEDICINE

## 2017-09-21 PROCEDURE — 90653 IIV ADJUVANT VACCINE IM: CPT | Performed by: FAMILY MEDICINE

## 2017-09-21 PROCEDURE — G0439 PPPS, SUBSEQ VISIT: HCPCS | Performed by: FAMILY MEDICINE

## 2017-09-21 RX ORDER — DICLOFENAC SODIUM 75 MG/1
75 TABLET, DELAYED RELEASE ORAL 2 TIMES DAILY
Qty: 60 TABLET | Refills: 1 | Status: SHIPPED | OUTPATIENT
Start: 2017-09-21 | End: 2017-10-25

## 2017-09-21 RX ORDER — HYDROCODONE BITARTRATE AND ACETAMINOPHEN 10; 325 MG/1; MG/1
1 TABLET ORAL EVERY 8 HOURS PRN
Qty: 90 TABLET | Refills: 0 | Status: SHIPPED | OUTPATIENT
Start: 2017-09-23 | End: 2017-10-19

## 2017-09-21 NOTE — PROGRESS NOTES
HPI:   Feng Escobedo is a 74year old male who presents for a Medicare Annual Wellness visit.     Patient Active Problem List:     Hypertension     Allergic rhinitis     Hypothyroid     Insomnia     Hypertriglyceridemia     Chronic gout     Okeefe's help    Taking medications as prescribed: Able without help    Are you able to afford your medications?: Yes    Hearing Problems?: No     Functional Status     Hearing Problems?: No    Vision Problems? : No    Difficulty walking?: No    Difficulty dressing Take 1 tablet (100 mg total) by mouth daily.  Disp: 30 tablet Rfl: 6   METOPROLOL TARTRATE 25 MG Oral Tab TAKE 1 TABLET (25MG) BY MOUTH TWICE DAILY Disp: 180 tablet Rfl: 1   hydrocodone-acetaminophen 7.5-325 MG Oral Tab Take 1 tablet by mouth every 8 (eight Grandfather       SOCIAL HISTORY:   Smoking status: Former Smoker                                                              Packs/day: 0.00      Years: 0.00         Types: Cigarettes     Quit date: 5/9/2001  Smokeless tobacco: Never Used good rectal tone, prostate shows no masses  MUSCULOSKELETAL: back is not tender, FROM of the back  EXTREMITIES: no cyanosis, clubbing or edema.   NEURO: Oriented times three, cranial nerves are intact, motor and sensory are grossly intact    ASSESSMENT AND patient is asked to return in 1 yr  for PE.        PREVENTATIVE SERVICES  INDICATIONS AND SCHEDULE Internal Lab or Procedure External Lab or Procedure   Diabetes Screening      HbgA1C   Annually GLYCOHEMOGLOBIN (HgA1c) (L) (%)   Date Value   08/25/2016 5.9 08/25/2016 1.34    No flowsheet data found. BUN  Annually BUN (mg/dL)   Date Value   02/21/2017 14   08/25/2016 15    No flowsheet data found. Drug Serum Conc  Annually No results found for: DIGOXIN, DIG, VALP No flowsheet data found.     Diabetes

## 2017-09-21 NOTE — PATIENT INSTRUCTIONS
Please follow up end of October    Must see GI for colonoscopy, Dr Dion Pace for back and start PT or will stop refilling meds    Goal to get off norco    No norco refills for one month

## 2017-09-23 DIAGNOSIS — I10 ESSENTIAL HYPERTENSION: ICD-10-CM

## 2017-09-25 RX ORDER — LISINOPRIL AND HYDROCHLOROTHIAZIDE 25; 20 MG/1; MG/1
1 TABLET ORAL DAILY
Qty: 30 TABLET | Refills: 0 | Status: SHIPPED | OUTPATIENT
Start: 2017-09-25 | End: 2017-09-29

## 2017-09-27 ENCOUNTER — NURSE ONLY (OUTPATIENT)
Dept: INTERNAL MEDICINE CLINIC | Facility: CLINIC | Age: 72
End: 2017-09-27

## 2017-09-27 DIAGNOSIS — Z00.00 MEDICARE ANNUAL WELLNESS VISIT, SUBSEQUENT: ICD-10-CM

## 2017-09-27 LAB
ALBUMIN SERPL BCP-MCNC: 4 G/DL (ref 3.5–4.8)
ALBUMIN/GLOB SERPL: 1.4 {RATIO} (ref 1–2)
ALP SERPL-CCNC: 67 U/L (ref 32–100)
ALT SERPL-CCNC: 20 U/L (ref 17–63)
ANION GAP SERPL CALC-SCNC: 8 MMOL/L (ref 0–18)
AST SERPL-CCNC: 36 U/L (ref 15–41)
BASOPHILS # BLD: 0.1 K/UL (ref 0–0.2)
BASOPHILS NFR BLD: 1 %
BILIRUB SERPL-MCNC: 0.4 MG/DL (ref 0.3–1.2)
BUN SERPL-MCNC: 24 MG/DL (ref 8–20)
BUN/CREAT SERPL: 14.1 (ref 10–20)
CALCIUM SERPL-MCNC: 9.3 MG/DL (ref 8.5–10.5)
CHLORIDE SERPL-SCNC: 100 MMOL/L (ref 95–110)
CHOLEST SERPL-MCNC: 180 MG/DL (ref 110–200)
CO2 SERPL-SCNC: 25 MMOL/L (ref 22–32)
CREAT SERPL-MCNC: 1.7 MG/DL (ref 0.5–1.5)
EOSINOPHIL # BLD: 0.4 K/UL (ref 0–0.7)
EOSINOPHIL NFR BLD: 5 %
ERYTHROCYTE [DISTWIDTH] IN BLOOD BY AUTOMATED COUNT: 13.8 % (ref 11–15)
GLOBULIN PLAS-MCNC: 2.8 G/DL (ref 2.5–3.7)
GLUCOSE SERPL-MCNC: 113 MG/DL (ref 70–99)
HCT VFR BLD AUTO: 39.4 % (ref 41–52)
HDLC SERPL-MCNC: 44 MG/DL
HGB BLD-MCNC: 13.6 G/DL (ref 13.5–17.5)
LDLC SERPL CALC-MCNC: 105 MG/DL (ref 0–99)
LYMPHOCYTES # BLD: 2.1 K/UL (ref 1–4)
LYMPHOCYTES NFR BLD: 28 %
MCH RBC QN AUTO: 29.9 PG (ref 27–32)
MCHC RBC AUTO-ENTMCNC: 34.5 G/DL (ref 32–37)
MCV RBC AUTO: 86.8 FL (ref 80–100)
MONOCYTES # BLD: 0.7 K/UL (ref 0–1)
MONOCYTES NFR BLD: 9 %
NEUTROPHILS # BLD AUTO: 4.4 K/UL (ref 1.8–7.7)
NEUTROPHILS NFR BLD: 58 %
NONHDLC SERPL-MCNC: 136 MG/DL
OSMOLALITY UR CALC.SUM OF ELEC: 281 MOSM/KG (ref 275–295)
PLATELET # BLD AUTO: 204 K/UL (ref 140–400)
PMV BLD AUTO: 8 FL (ref 7.4–10.3)
POTASSIUM SERPL-SCNC: 4.2 MMOL/L (ref 3.3–5.1)
PROT SERPL-MCNC: 6.8 G/DL (ref 5.9–8.4)
RBC # BLD AUTO: 4.53 M/UL (ref 4.5–5.9)
SODIUM SERPL-SCNC: 133 MMOL/L (ref 136–144)
TRIGL SERPL-MCNC: 156 MG/DL (ref 1–149)
TSH SERPL-ACNC: 2.67 UIU/ML (ref 0.45–5.33)
WBC # BLD AUTO: 7.5 K/UL (ref 4–11)

## 2017-09-27 PROCEDURE — 80061 LIPID PANEL: CPT | Performed by: FAMILY MEDICINE

## 2017-09-27 PROCEDURE — 80053 COMPREHEN METABOLIC PANEL: CPT | Performed by: FAMILY MEDICINE

## 2017-09-27 PROCEDURE — 84443 ASSAY THYROID STIM HORMONE: CPT | Performed by: FAMILY MEDICINE

## 2017-09-27 PROCEDURE — 85025 COMPLETE CBC W/AUTO DIFF WBC: CPT | Performed by: FAMILY MEDICINE

## 2017-09-27 PROCEDURE — 36415 COLL VENOUS BLD VENIPUNCTURE: CPT | Performed by: FAMILY MEDICINE

## 2017-09-29 DIAGNOSIS — I10 ESSENTIAL HYPERTENSION: ICD-10-CM

## 2017-09-29 RX ORDER — LISINOPRIL AND HYDROCHLOROTHIAZIDE 25; 20 MG/1; MG/1
1 TABLET ORAL DAILY
Qty: 30 TABLET | Refills: 0 | Status: SHIPPED | OUTPATIENT
Start: 2017-09-29 | End: 2017-10-25

## 2017-10-05 ENCOUNTER — TELEPHONE (OUTPATIENT)
Dept: NEUROLOGY | Facility: CLINIC | Age: 72
End: 2017-10-05

## 2017-10-05 ENCOUNTER — APPOINTMENT (OUTPATIENT)
Dept: PHYSICAL THERAPY | Facility: HOSPITAL | Age: 72
End: 2017-10-05
Attending: FAMILY MEDICINE
Payer: MEDICARE

## 2017-10-05 ENCOUNTER — OFFICE VISIT (OUTPATIENT)
Dept: NEUROLOGY | Facility: CLINIC | Age: 72
End: 2017-10-05

## 2017-10-05 VITALS
HEIGHT: 65 IN | HEART RATE: 72 BPM | RESPIRATION RATE: 16 BRPM | WEIGHT: 145 LBS | SYSTOLIC BLOOD PRESSURE: 102 MMHG | BODY MASS INDEX: 24.16 KG/M2 | DIASTOLIC BLOOD PRESSURE: 62 MMHG

## 2017-10-05 DIAGNOSIS — M54.50 CHRONIC BILATERAL LOW BACK PAIN WITHOUT SCIATICA: Primary | ICD-10-CM

## 2017-10-05 DIAGNOSIS — G89.29 CHRONIC BILATERAL LOW BACK PAIN WITHOUT SCIATICA: Primary | ICD-10-CM

## 2017-10-05 DIAGNOSIS — E88.2 DERCUM'S DISEASE: ICD-10-CM

## 2017-10-05 PROCEDURE — 99203 OFFICE O/P NEW LOW 30 MIN: CPT | Performed by: PHYSICAL MEDICINE & REHABILITATION

## 2017-10-05 NOTE — H&P
No referring provider defined for this encounter.   Lydia Ramirez MD    PHYSICAL MEDICINE and REHABILITATION NEW PATIENT    Chief Complaint: low back pain    HPI: Manoj Osorio is a 74year old male who presents with a chief complaint of Pain (pt Previous treatments:  Has not done physical therapy, injections or lumbosacral surgery    Current Pain: 6    Sleep: unaffected    Review of system/ Fam. Hx/ Soc.  Hx:    Fever/chills: no  Rash: no  Unintended weight loss: no   Blur vision: no  Shortness of aspirin 81 MG Oral Chew Tab Chew 1 tablet (81 mg total) by mouth daily. Disp: 30 tablet Rfl: 2       Dust Mites                Pollen                      Social History  Social History   Marital status:    Spouse name: N/A    Years of education: N/A Right Left                     Right Left   Hip Flexor 5 5  Knee 2 2   Hip Adductor 5 5  Hamstring NT NT   Hip Abductor 5 5  Achilles 2 2   Knee Flexor 5 5  4=hyperreflex, 3=brisk, 2=normal, 1=hyporeflex, 0=no reflex NT=Not tested   Knee Extensor 5 5 VERTEBRAL BODIES:  No evidence of fracture. T1 marrow signal is preserved. No significant bone marrow edema. There is moderate multilevel intervertebral disc height loss with multilevel disc desiccation.  Discogenic endplate signal changes are seen at   L2- Chronic gout     Okeefe's neuroma     Chronic kidney disease (CKD) stage G3a/A1, moderately decreased glomerular filtration rate (GFR) between 45-59 mL/min/1.73 square meter and albuminuria creatinine ratio less than 30 mg/g     Lung nodule, solitary

## 2017-10-05 NOTE — TELEPHONE ENCOUNTER
Medicare Online for insurance coverage of left lipoma corticosteroid injection (dercum's disease) cpt code 88454. Insurance was verified and procedure is a covered benefit and does not require authhorization. Will inform Nursing.

## 2017-10-10 ENCOUNTER — APPOINTMENT (OUTPATIENT)
Dept: PHYSICAL THERAPY | Facility: HOSPITAL | Age: 72
End: 2017-10-10
Attending: FAMILY MEDICINE
Payer: MEDICARE

## 2017-10-12 ENCOUNTER — APPOINTMENT (OUTPATIENT)
Dept: PHYSICAL THERAPY | Facility: HOSPITAL | Age: 72
End: 2017-10-12
Attending: FAMILY MEDICINE
Payer: MEDICARE

## 2017-10-16 ENCOUNTER — APPOINTMENT (OUTPATIENT)
Dept: PHYSICAL THERAPY | Facility: HOSPITAL | Age: 72
End: 2017-10-16
Attending: FAMILY MEDICINE
Payer: MEDICARE

## 2017-10-17 ENCOUNTER — TELEPHONE (OUTPATIENT)
Dept: GASTROENTEROLOGY | Facility: CLINIC | Age: 72
End: 2017-10-17

## 2017-10-17 DIAGNOSIS — K21.9 GASTROESOPHAGEAL REFLUX DISEASE, ESOPHAGITIS PRESENCE NOT SPECIFIED: ICD-10-CM

## 2017-10-17 DIAGNOSIS — Z12.11 COLON CANCER SCREENING: Primary | ICD-10-CM

## 2017-10-18 ENCOUNTER — APPOINTMENT (OUTPATIENT)
Dept: PHYSICAL THERAPY | Facility: HOSPITAL | Age: 72
End: 2017-10-18
Attending: FAMILY MEDICINE
Payer: MEDICARE

## 2017-10-18 NOTE — TELEPHONE ENCOUNTER
Last seen for consult 7/7/16 (over a year ago)    Previously canceled back in April (6 months ago)    I called pt to get an update as he was previously seen and scheduled for colonoscopy screening and EGD for GERD    He states he does continue to have prob

## 2017-10-19 DIAGNOSIS — M51.26 BULGING LUMBAR DISC: ICD-10-CM

## 2017-10-19 RX ORDER — HYDROCODONE BITARTRATE AND ACETAMINOPHEN 10; 325 MG/1; MG/1
1 TABLET ORAL EVERY 8 HOURS PRN
Qty: 90 TABLET | Refills: 0 | Status: SHIPPED | OUTPATIENT
Start: 2017-10-23 | End: 2018-01-23

## 2017-10-23 ENCOUNTER — APPOINTMENT (OUTPATIENT)
Dept: PHYSICAL THERAPY | Facility: HOSPITAL | Age: 72
End: 2017-10-23
Attending: FAMILY MEDICINE
Payer: MEDICARE

## 2017-10-23 ENCOUNTER — TELEPHONE (OUTPATIENT)
Dept: INTERNAL MEDICINE CLINIC | Facility: CLINIC | Age: 72
End: 2017-10-23

## 2017-10-23 NOTE — TELEPHONE ENCOUNTER
Scheduled for:  Colonoscopy - 91722, EGD - 73667  Provider Name:  Dr. Kassy Spence  Date:  1/9/18  Location:  [facility]  Sedation:  IV  Time:  1:00 pm (pt is aware to arrive at 12:00 pm)  Prep:  Miralax/Gatorade, Prep instructions were given to pt over the phone, robert delarosa

## 2017-10-25 ENCOUNTER — OFFICE VISIT (OUTPATIENT)
Dept: INTERNAL MEDICINE CLINIC | Facility: CLINIC | Age: 72
End: 2017-10-25

## 2017-10-25 VITALS
SYSTOLIC BLOOD PRESSURE: 130 MMHG | TEMPERATURE: 98 F | HEART RATE: 70 BPM | OXYGEN SATURATION: 98 % | HEIGHT: 65 IN | DIASTOLIC BLOOD PRESSURE: 68 MMHG | BODY MASS INDEX: 24.16 KG/M2 | WEIGHT: 145 LBS | RESPIRATION RATE: 12 BRPM

## 2017-10-25 DIAGNOSIS — N28.9 KIDNEY DISEASE: Primary | ICD-10-CM

## 2017-10-25 DIAGNOSIS — F41.9 ANXIETY: ICD-10-CM

## 2017-10-25 DIAGNOSIS — E03.9 HYPOTHYROIDISM, UNSPECIFIED TYPE: ICD-10-CM

## 2017-10-25 DIAGNOSIS — I10 ESSENTIAL HYPERTENSION: ICD-10-CM

## 2017-10-25 DIAGNOSIS — M51.26 BULGING LUMBAR DISC: ICD-10-CM

## 2017-10-25 PROCEDURE — 99214 OFFICE O/P EST MOD 30 MIN: CPT | Performed by: FAMILY MEDICINE

## 2017-10-25 RX ORDER — HYDROCODONE BITARTRATE AND ACETAMINOPHEN 10; 325 MG/1; MG/1
1 TABLET ORAL EVERY 8 HOURS PRN
Qty: 90 TABLET | Refills: 0 | Status: SHIPPED | OUTPATIENT
Start: 2017-11-23 | End: 2017-12-23

## 2017-10-25 RX ORDER — LEVOTHYROXINE SODIUM 137 UG/1
137 TABLET ORAL
Qty: 90 TABLET | Refills: 0 | Status: SHIPPED | OUTPATIENT
Start: 2017-10-25 | End: 2018-04-09

## 2017-10-25 RX ORDER — LISINOPRIL AND HYDROCHLOROTHIAZIDE 25; 20 MG/1; MG/1
1 TABLET ORAL DAILY
Qty: 90 TABLET | Refills: 1 | Status: SHIPPED | OUTPATIENT
Start: 2017-10-25 | End: 2017-11-28

## 2017-10-25 RX ORDER — DICLOFENAC SODIUM 75 MG/1
75 TABLET, DELAYED RELEASE ORAL 2 TIMES DAILY
Qty: 60 TABLET | Refills: 3 | Status: SHIPPED | OUTPATIENT
Start: 2017-10-25 | End: 2017-11-16

## 2017-10-25 RX ORDER — HYDROCODONE BITARTRATE AND ACETAMINOPHEN 10; 325 MG/1; MG/1
1 TABLET ORAL EVERY 8 HOURS PRN
Qty: 90 TABLET | Refills: 0 | Status: SHIPPED | OUTPATIENT
Start: 2017-12-23 | End: 2018-01-23

## 2017-10-25 RX ORDER — LORAZEPAM 2 MG/1
1 TABLET ORAL 2 TIMES DAILY
Qty: 60 TABLET | Refills: 2 | Status: SHIPPED | OUTPATIENT
Start: 2017-10-25 | End: 2018-03-12

## 2017-10-25 NOTE — PROGRESS NOTES
Shirley Curran is a 74year old male.     CC:  Patient presents with:  Medication Follow-Up: Pt presents to clinic for medication follow up       HPI:    Hx bulging disc and lipoma in lower back   Back- going to Dr Justice Reasons with physiatry   Has not start History:   Diagnosis Date   • Allergic rhinitis    • Alopecia areata    • Esophageal reflux    • Hypothyroid    • Unspecified essential hypertension       No past surgical history on file.    Family History   Problem Relation Age of Onset   • Other [address] no guarding.   PSYCH:  Alert and oriented x 3, affect appropriate  SKIN: No rashes, no lesions  EXTREMITIES:  Bilaterally warm, no edema, no rashes  LYMPH:  No cervical lymphadenopathy  MUSCULOSKELETAL: Normal ambulation and movement, + ttp Left lumbar spin 137 mcg by mouth before breakfast.  Dispense: 90 tablet; Refill: 0      There are no diagnoses linked to this encounter. No orders of the defined types were placed in this encounter.     None

## 2017-10-26 ENCOUNTER — APPOINTMENT (OUTPATIENT)
Dept: PHYSICAL THERAPY | Facility: HOSPITAL | Age: 72
End: 2017-10-26
Attending: FAMILY MEDICINE
Payer: MEDICARE

## 2017-10-28 DIAGNOSIS — I10 ESSENTIAL HYPERTENSION: ICD-10-CM

## 2017-10-30 RX ORDER — LISINOPRIL AND HYDROCHLOROTHIAZIDE 25; 20 MG/1; MG/1
1 TABLET ORAL DAILY
Qty: 30 TABLET | Refills: 6 | Status: SHIPPED | OUTPATIENT
Start: 2017-10-30 | End: 2017-11-28 | Stop reason: DRUGHIGH

## 2017-11-01 ENCOUNTER — APPOINTMENT (OUTPATIENT)
Dept: PHYSICAL THERAPY | Facility: HOSPITAL | Age: 72
End: 2017-11-01
Attending: FAMILY MEDICINE
Payer: MEDICARE

## 2017-11-16 DIAGNOSIS — M51.26 BULGING LUMBAR DISC: ICD-10-CM

## 2017-11-16 RX ORDER — DICLOFENAC SODIUM 75 MG/1
75 TABLET, DELAYED RELEASE ORAL 2 TIMES DAILY
Qty: 60 TABLET | Refills: 3 | Status: SHIPPED | OUTPATIENT
Start: 2017-11-16 | End: 2018-06-13

## 2017-11-28 ENCOUNTER — OFFICE VISIT (OUTPATIENT)
Dept: NEPHROLOGY | Facility: CLINIC | Age: 72
End: 2017-11-28

## 2017-11-28 VITALS
SYSTOLIC BLOOD PRESSURE: 104 MMHG | DIASTOLIC BLOOD PRESSURE: 66 MMHG | BODY MASS INDEX: 26.53 KG/M2 | WEIGHT: 151.63 LBS | HEART RATE: 84 BPM | HEIGHT: 63.5 IN

## 2017-11-28 DIAGNOSIS — N17.9 AKI (ACUTE KIDNEY INJURY) (HCC): Primary | ICD-10-CM

## 2017-11-28 DIAGNOSIS — N18.30 CKD (CHRONIC KIDNEY DISEASE), STAGE III (HCC): ICD-10-CM

## 2017-11-28 PROCEDURE — 99204 OFFICE O/P NEW MOD 45 MIN: CPT | Performed by: INTERNAL MEDICINE

## 2017-11-28 PROCEDURE — G0463 HOSPITAL OUTPT CLINIC VISIT: HCPCS | Performed by: INTERNAL MEDICINE

## 2017-11-28 RX ORDER — LISINOPRIL 20 MG/1
20 TABLET ORAL DAILY
Qty: 90 TABLET | Refills: 0 | Status: SHIPPED | OUTPATIENT
Start: 2017-11-28 | End: 2018-04-09

## 2017-11-28 NOTE — PROGRESS NOTES
Consult Requested By: Dr. Kathrine Sheets    Reason for Consult: CKD stage III     HPI:     Lukasz López is a 74 yrs old male with Pmh of HTN, hypothyroidism, CKD stage III, MSK back pain who presented for work up and evaluation of kidney disease 25 MG Oral Tab Take 1 tablet (25 mg total) by mouth 2 (two) times daily. Disp: 180 tablet Rfl: 1   LORazepam 2 MG Oral Tab Take 0.5 tablets (1 mg total) by mouth 2 (two) times daily.  Disp: 60 tablet Rfl: 2   HYDROcodone-acetaminophen (NORCO)  MG Oral PHYSICAL EXAM:   Constitutional: appears well hydrated alert and responsive no acute distress noted  Head/Face: normocephalic  Eyes/Vision: normal extraocular motion is intact  Nose/Mouth/Throat:mucous membranes are moist   Neck/Thyroid: neck is supp

## 2017-11-28 NOTE — PATIENT INSTRUCTIONS
Kidney ultrasound as ordered - call to make an appointment  Blood and urine test in 1-2 weeks  Follow up in 4 weeks   Monitor blood pressure at home and bring readings on next visit   Stop hctz/lisinopril and take only lisinopril

## 2017-12-06 ENCOUNTER — APPOINTMENT (OUTPATIENT)
Dept: LAB | Age: 72
End: 2017-12-06
Attending: INTERNAL MEDICINE
Payer: MEDICARE

## 2017-12-06 ENCOUNTER — HOSPITAL ENCOUNTER (OUTPATIENT)
Dept: ULTRASOUND IMAGING | Age: 72
Discharge: HOME | End: 2017-12-06
Attending: INTERNAL MEDICINE
Payer: MEDICARE

## 2017-12-06 DIAGNOSIS — N17.9 AKI (ACUTE KIDNEY INJURY) (HCC): ICD-10-CM

## 2017-12-06 DIAGNOSIS — N18.30 CKD (CHRONIC KIDNEY DISEASE), STAGE III (HCC): ICD-10-CM

## 2017-12-06 PROCEDURE — 82043 UR ALBUMIN QUANTITATIVE: CPT

## 2017-12-06 PROCEDURE — 82570 ASSAY OF URINE CREATININE: CPT

## 2017-12-06 PROCEDURE — 76770 US EXAM ABDO BACK WALL COMP: CPT | Performed by: INTERNAL MEDICINE

## 2017-12-06 PROCEDURE — 81003 URINALYSIS AUTO W/O SCOPE: CPT

## 2017-12-06 PROCEDURE — 36415 COLL VENOUS BLD VENIPUNCTURE: CPT

## 2017-12-06 PROCEDURE — 84156 ASSAY OF PROTEIN URINE: CPT

## 2017-12-06 PROCEDURE — 80069 RENAL FUNCTION PANEL: CPT

## 2018-01-08 ENCOUNTER — TELEPHONE (OUTPATIENT)
Dept: INTERNAL MEDICINE CLINIC | Facility: CLINIC | Age: 73
End: 2018-01-08

## 2018-01-19 ENCOUNTER — TELEPHONE (OUTPATIENT)
Dept: INTERNAL MEDICINE CLINIC | Facility: CLINIC | Age: 73
End: 2018-01-19

## 2018-01-19 NOTE — TELEPHONE ENCOUNTER
Which is why the patient is out of medication because he is taking more than the prescription he just filled a #60 day supply which he can refill again on 2/17/2018. Please advise if script will be adjusted, patient is out of the medication.

## 2018-01-23 ENCOUNTER — OFFICE VISIT (OUTPATIENT)
Dept: FAMILY MEDICINE CLINIC | Facility: CLINIC | Age: 73
End: 2018-01-23

## 2018-01-23 VITALS
HEIGHT: 65 IN | TEMPERATURE: 99 F | OXYGEN SATURATION: 98 % | SYSTOLIC BLOOD PRESSURE: 134 MMHG | BODY MASS INDEX: 24.66 KG/M2 | WEIGHT: 148 LBS | HEART RATE: 72 BPM | DIASTOLIC BLOOD PRESSURE: 88 MMHG | RESPIRATION RATE: 13 BRPM

## 2018-01-23 DIAGNOSIS — G47.00 INSOMNIA, UNSPECIFIED TYPE: Primary | ICD-10-CM

## 2018-01-23 DIAGNOSIS — G89.29 CHRONIC BILATERAL LOW BACK PAIN WITHOUT SCIATICA: ICD-10-CM

## 2018-01-23 DIAGNOSIS — A08.4 VIRAL GASTROENTERITIS: ICD-10-CM

## 2018-01-23 DIAGNOSIS — M51.26 BULGING LUMBAR DISC: ICD-10-CM

## 2018-01-23 DIAGNOSIS — M54.50 CHRONIC BILATERAL LOW BACK PAIN WITHOUT SCIATICA: ICD-10-CM

## 2018-01-23 PROCEDURE — 99213 OFFICE O/P EST LOW 20 MIN: CPT | Performed by: FAMILY MEDICINE

## 2018-01-23 RX ORDER — LORAZEPAM 1 MG/1
1 TABLET ORAL 2 TIMES DAILY
Qty: 60 TABLET | Refills: 0 | Status: SHIPPED | OUTPATIENT
Start: 2018-01-23 | End: 2018-04-23

## 2018-01-23 RX ORDER — HYDROCODONE BITARTRATE AND ACETAMINOPHEN 10; 325 MG/1; MG/1
1 TABLET ORAL EVERY 8 HOURS PRN
Qty: 60 TABLET | Refills: 0 | Status: SHIPPED | OUTPATIENT
Start: 2018-01-23 | End: 2018-02-23

## 2018-01-23 NOTE — PROGRESS NOTES
HPI:    Patient ID: Israel Rodriguez is a 74year old male. Insomnia/Anixety  -pt was given script for 2mg lorazepam tablets. Supposed to take 1/2 tablet BID.  Instead was taking full tablet BID and ran out early  -pt expressed confusion and taking i daily. Disp: 180 tablet Rfl: 1   LORazepam 2 MG Oral Tab Take 0.5 tablets (1 mg total) by mouth 2 (two) times daily. Disp: 60 tablet Rfl: 2   Sertraline HCl 100 MG Oral Tab Take 1 tablet (100 mg total) by mouth daily.  Disp: 30 tablet Rfl: 6   Clotrimazole further scripts will be provided if takes wrongfully.  Advised to read instruction labels carefully  -discussed risks of medication, including BEERS list and addictive potential  -encouraged weaning off if possible    Bulging lumbar disc  Chronic bilateral

## 2018-02-12 ENCOUNTER — TELEPHONE (OUTPATIENT)
Dept: INTERNAL MEDICINE CLINIC | Facility: CLINIC | Age: 73
End: 2018-02-12

## 2018-02-12 NOTE — PATIENT INSTRUCTIONS
Follow up 1 month    Ativan 1-2 mg at night, do not take in morning    Increase zoloft to 150 mg daily ( 100 + 50)     Must make appt with psychiatrist/ therapist and also Dr Rolan Albarran if want to keep getting meds filled

## 2018-02-12 NOTE — PROGRESS NOTES
Maritza Newman is a 74year old male.     CC:  Patient presents with:  Medication Question: Pt presents to clinic for medication follow up       HPI:    Pt presents early for refill of lorazepam and norco      Anxiety  Takes ativan at night to sleep and total) by mouth 2 (two) times daily. Disp: 60 tablet Rfl: 2   Sertraline HCl 100 MG Oral Tab Take 1 tablet (100 mg total) by mouth daily.  Disp: 30 tablet Rfl: 6   Clotrimazole 1 % External Ointment Apply 1 Application topically 2 (two) times daily as neede numbness.         Vitals: /80   Pulse 84   Temp 98.4 °F (36.9 °C)   Resp 12   Ht 65\"   Wt 150 lb   SpO2 98%   BMI 24.96 kg/m²       Physical Exam:  GEN:  Well developed, well nourished, in no apparent distress  EYE: Bilateral conjunctiva and lids nor problem instead of rely on norco  Needs to avoid nsaids given hx BENITO. - HYDROcodone-acetaminophen 5-325 MG Oral Tab; Take 1 tablet by mouth every 8 (eight) hours as needed for Pain. Dispense: 90 tablet;  Refill: 0  - PHYSIATRY - INTERNAL    Greater than

## 2018-03-12 NOTE — PROGRESS NOTES
Fernando Prakash is a 74year old male.     CC:  Patient presents with:  Medication Follow-Up      HPI:    Pt presents refill of lorazepam and norco      Anxiety  Last visit- zoloft increased to 150 and trying to decrease ativan to 2mg qhs or 1 mg bid ( w Disp: 180 tablet Rfl: 1   Clotrimazole 1 % External Ointment Apply 1 Application topically 2 (two) times daily as needed. Disp: 1 Tube Rfl: 0   triamcinolone acetonide 0.1 % External Cream Apply topically 2 (two) times daily as needed.  Disp: 60 g Rfl: 0 Physical Exam:  GEN:  Well developed, well nourished, in no apparent distress  EYE: Bilateral conjunctiva and lids normal  HENT: Moist oral mucosa, normal teeth  NECK: No lymphadenopathy or masses  CAR:  RRR, no murmurs, S1 and S2 normal  PULM: Clear t

## 2018-03-13 ENCOUNTER — OFFICE VISIT (OUTPATIENT)
Dept: NEUROLOGY | Facility: CLINIC | Age: 73
End: 2018-03-13

## 2018-03-13 VITALS
HEIGHT: 65 IN | DIASTOLIC BLOOD PRESSURE: 78 MMHG | HEART RATE: 70 BPM | SYSTOLIC BLOOD PRESSURE: 146 MMHG | WEIGHT: 146 LBS | RESPIRATION RATE: 18 BRPM | BODY MASS INDEX: 24.32 KG/M2

## 2018-03-13 DIAGNOSIS — M54.50 CHRONIC BILATERAL LOW BACK PAIN WITHOUT SCIATICA: Primary | ICD-10-CM

## 2018-03-13 DIAGNOSIS — G89.29 CHRONIC BILATERAL LOW BACK PAIN WITHOUT SCIATICA: Primary | ICD-10-CM

## 2018-03-13 PROCEDURE — 76942 ECHO GUIDE FOR BIOPSY: CPT | Performed by: PHYSICAL MEDICINE & REHABILITATION

## 2018-03-13 PROCEDURE — 11900 INJECT SKIN LESIONS </W 7: CPT | Performed by: PHYSICAL MEDICINE & REHABILITATION

## 2018-03-13 RX ORDER — LIDOCAINE HYDROCHLORIDE 10 MG/ML
4 INJECTION, SOLUTION INFILTRATION; PERINEURAL ONCE
Status: COMPLETED | OUTPATIENT
Start: 2018-03-13 | End: 2018-03-13

## 2018-03-13 RX ORDER — TRIAMCINOLONE ACETONIDE 40 MG/ML
40 INJECTION, SUSPENSION INTRA-ARTICULAR; INTRAMUSCULAR ONCE
Status: COMPLETED | OUTPATIENT
Start: 2018-03-13 | End: 2018-03-13

## 2018-03-13 NOTE — PATIENT INSTRUCTIONS
As of October 6th 2014, the Drug Enforcement Agency [facility]) is reclassifying all hydrocodone combination medications from Schedule III to Schedule II. This includes medications such as Norco, Vicodin, Lortab, Zohydro, and Vicoprofen.     What this means for y

## 2018-03-15 DIAGNOSIS — K13.0 ANGULAR CHEILITIS: ICD-10-CM

## 2018-03-16 ENCOUNTER — MED REC SCAN ONLY (OUTPATIENT)
Dept: NEUROLOGY | Facility: CLINIC | Age: 73
End: 2018-03-16

## 2018-03-21 ENCOUNTER — OFFICE VISIT (OUTPATIENT)
Dept: INTERNAL MEDICINE CLINIC | Facility: CLINIC | Age: 73
End: 2018-03-21

## 2018-03-21 ENCOUNTER — TELEPHONE (OUTPATIENT)
Dept: INTERNAL MEDICINE CLINIC | Facility: CLINIC | Age: 73
End: 2018-03-21

## 2018-03-21 VITALS
OXYGEN SATURATION: 100 % | TEMPERATURE: 98 F | DIASTOLIC BLOOD PRESSURE: 70 MMHG | HEIGHT: 65 IN | RESPIRATION RATE: 12 BRPM | HEART RATE: 72 BPM | SYSTOLIC BLOOD PRESSURE: 130 MMHG | BODY MASS INDEX: 25.49 KG/M2 | WEIGHT: 153 LBS

## 2018-03-21 DIAGNOSIS — G47.00 INSOMNIA, UNSPECIFIED TYPE: ICD-10-CM

## 2018-03-21 DIAGNOSIS — G25.81 RESTLESS LEG SYNDROME: Primary | ICD-10-CM

## 2018-03-21 DIAGNOSIS — F41.9 ANXIETY: ICD-10-CM

## 2018-03-21 LAB
IRON SATN MFR SERPL: 7 % (ref 20–55)
IRON SERPL-MCNC: 31 MCG/DL (ref 45–182)
TIBC SERPL-MCNC: 433 MCG/DL (ref 228–428)
TRANSFERRIN SERPL-MCNC: 328 MG/DL (ref 180–329)

## 2018-03-21 PROCEDURE — 83540 ASSAY OF IRON: CPT | Performed by: FAMILY MEDICINE

## 2018-03-21 PROCEDURE — 84466 ASSAY OF TRANSFERRIN: CPT | Performed by: FAMILY MEDICINE

## 2018-03-21 PROCEDURE — 99214 OFFICE O/P EST MOD 30 MIN: CPT | Performed by: FAMILY MEDICINE

## 2018-03-21 RX ORDER — TRAZODONE HYDROCHLORIDE 50 MG/1
50 TABLET ORAL NIGHTLY
Qty: 30 TABLET | Refills: 0 | Status: SHIPPED | OUTPATIENT
Start: 2018-03-21 | End: 2018-04-14

## 2018-03-21 RX ORDER — LORAZEPAM 1 MG/1
1 TABLET ORAL EVERY 8 HOURS PRN
Qty: 90 TABLET | Refills: 0 | Status: SHIPPED | OUTPATIENT
Start: 2018-03-26 | End: 2018-03-28

## 2018-03-21 RX ORDER — CLOTRIMAZOLE 1 %
1 CREAM (GRAM) TOPICAL 2 TIMES DAILY
Qty: 1 TUBE | Refills: 1 | Status: SHIPPED | OUTPATIENT
Start: 2018-03-21 | End: 2018-06-15 | Stop reason: ALTCHOICE

## 2018-03-21 NOTE — PROGRESS NOTES
Nicci Good is a 74year old male. CC:  No chief complaint on file. HPI:    Gets funny sensation in legs - slightly in arms- at night in bed. Goes away when gets up and does some exercises. Annoying , no pain. No CP or SOB.  Just feels like n Disp: 180 tablet Rfl: 1   triamcinolone acetonide 0.1 % External Cream Apply topically 2 (two) times daily as needed. Disp: 60 g Rfl: 0   aspirin 81 MG Oral Chew Tab Chew 1 tablet (81 mg total) by mouth daily.  Disp: 30 tablet Rfl: 2        History:  Past M normal  HENT: Moist oral mucosa, normal teeth  NECK: No lymphadenopathy or masses  CAR:  RRR, no murmurs, S1 and S2 normal  PULM: Clear to auscultation bilaterally  PSYCH:  Alert and oriented x 3, affect appropriate  SKIN: No rashes, no lesions  EXTREMITIE encounter.     None

## 2018-03-21 NOTE — PATIENT INSTRUCTIONS
Please make appt for psychiatry for insomnia  - no more early or increase ativan refills- must see them for this       Restless Legs Syndrome: What You Can Do  Symptoms of restless leg syndrome (RLS) can be treated.  Together, you and your healthcare provid Also, you will have more energy during the day and be more tired at bedtime. Afternoon exercise is best. Nighttime exercise may affect how well you sleep. Date Last Reviewed: 9/1/2017  © 0091-4864 [employer] [address]  [address] Self

## 2018-03-22 NOTE — TELEPHONE ENCOUNTER
I wrote for cream instead of ointment as that may be covered.  If not, he just needs to buy this over the counter- clotrimazole ointment or cream 1%

## 2018-03-22 NOTE — TELEPHONE ENCOUNTER
Called pt.  Left voicemail informing pt cream had been sent if not covered her would have to purchase clotrimazole 1% otc and to call us back if any questions

## 2018-03-26 ENCOUNTER — TELEPHONE (OUTPATIENT)
Dept: INTERNAL MEDICINE CLINIC | Facility: CLINIC | Age: 73
End: 2018-03-26

## 2018-03-26 NOTE — TELEPHONE ENCOUNTER
Pt's  verified  Called Pt back n/a l/m per LORIN sanchez - per Dr. Saleem Vargas informed-Please tell pt iron is on lower side. I would recommend iron pills twice a day- will send to pharmacy. If they make him constipated, he can take colace a stool softner.  I will

## 2018-03-27 NOTE — PROGRESS NOTES
Pt notified Per Dr. Tammy Eckert tell pt iron is on lower side. I would recommend iron pills twice a day- will send to pharmacy. If they make him constipated, he can take colace a stool softner. I will send that over also.  We can discuss more at his next vi

## 2018-03-28 ENCOUNTER — OFFICE VISIT (OUTPATIENT)
Dept: INTERNAL MEDICINE CLINIC | Facility: CLINIC | Age: 73
End: 2018-03-28

## 2018-03-28 VITALS
TEMPERATURE: 98 F | WEIGHT: 153 LBS | OXYGEN SATURATION: 98 % | BODY MASS INDEX: 25.49 KG/M2 | HEART RATE: 74 BPM | DIASTOLIC BLOOD PRESSURE: 80 MMHG | SYSTOLIC BLOOD PRESSURE: 138 MMHG | HEIGHT: 65 IN
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
## 2018-03-28 DIAGNOSIS — S51.852A DOG BITE OF LEFT FOREARM, INITIAL ENCOUNTER: Primary | ICD-10-CM

## 2018-03-28 PROCEDURE — 99213 OFFICE O/P EST LOW 20 MIN: CPT | Performed by: FAMILY MEDICINE

## 2018-03-28 PROCEDURE — 90471 IMMUNIZATION ADMIN: CPT | Performed by: FAMILY MEDICINE

## 2018-03-28 PROCEDURE — 90715 TDAP VACCINE 7 YRS/> IM: CPT | Performed by: FAMILY MEDICINE

## 2018-03-28 RX ORDER — DOCUSATE SODIUM 100 MG/1
100 CAPSULE, LIQUID FILLED ORAL 2 TIMES DAILY PRN
Qty: 60 CAPSULE | Refills: 2 | Status: SHIPPED | OUTPATIENT
Start: 2018-03-28 | End: 2018-04-09

## 2018-03-28 NOTE — PROGRESS NOTES
CC:  Bite (c/c patient got bit by a dog on left arm 2 days ago )      Hx of CC:    Left upper forearm- by elbow- bite by Kinyarwanda sheperd 3 days ago. Washed w soap, and put on triple antibiotic. No fevers. No drainage. Slight bruise at site.    No arm pain Allergic rhinitis    • Alopecia areata    • Esophageal reflux    • Hypothyroid    • Unspecified essential hypertension       No past surgical history on file.    Family History   Problem Relation Age of Onset   • Other [OTHER] Father      unknown caused   • encounter.

## 2018-03-28 NOTE — PATIENT INSTRUCTIONS
- please monitor for signs of infection- swelling ,redness, warmth, drainage and call right away      Dog Bite  A dog bite can cause a wound deep enough to break the skin.  In such cases, the wound is cleaned and sometimes closed. If the wound is closed, it · If someone’s pet dog has bitten you, it should be kept in a secure area for the next 10 days to watch for signs of illness.  (If the pet owner won’t allow this, contact your local animal control center.) If the dog becomes ill or dies during that time, co

## 2018-04-09 ENCOUNTER — OFFICE VISIT (OUTPATIENT)
Dept: INTERNAL MEDICINE CLINIC | Facility: CLINIC | Age: 73
End: 2018-04-09

## 2018-04-09 VITALS
SYSTOLIC BLOOD PRESSURE: 124 MMHG | RESPIRATION RATE: 12 BRPM | OXYGEN SATURATION: 100 % | TEMPERATURE: 98 F | DIASTOLIC BLOOD PRESSURE: 80 MMHG | HEIGHT: 65 IN | HEART RATE: 72 BPM | BODY MASS INDEX: 25.49 KG/M2 | WEIGHT: 153 LBS

## 2018-04-09 DIAGNOSIS — D17.79 LIPOMA OF OTHER SPECIFIED SITES: ICD-10-CM

## 2018-04-09 DIAGNOSIS — I10 ESSENTIAL HYPERTENSION: ICD-10-CM

## 2018-04-09 DIAGNOSIS — M54.50 CHRONIC BILATERAL LOW BACK PAIN WITHOUT SCIATICA: ICD-10-CM

## 2018-04-09 DIAGNOSIS — L85.3 DRY SKIN DERMATITIS: Primary | ICD-10-CM

## 2018-04-09 DIAGNOSIS — L30.1 DYSHIDROTIC ECZEMA: ICD-10-CM

## 2018-04-09 DIAGNOSIS — G89.29 CHRONIC BILATERAL LOW BACK PAIN WITHOUT SCIATICA: ICD-10-CM

## 2018-04-09 DIAGNOSIS — E03.9 HYPOTHYROIDISM, UNSPECIFIED TYPE: ICD-10-CM

## 2018-04-09 PROCEDURE — 99214 OFFICE O/P EST MOD 30 MIN: CPT | Performed by: FAMILY MEDICINE

## 2018-04-09 RX ORDER — LISINOPRIL 20 MG/1
20 TABLET ORAL DAILY
Qty: 90 TABLET | Refills: 0 | Status: SHIPPED | OUTPATIENT
Start: 2018-04-09 | End: 2018-07-05

## 2018-04-09 RX ORDER — HYDROCODONE BITARTRATE AND ACETAMINOPHEN 5; 325 MG/1; MG/1
1 TABLET ORAL EVERY 8 HOURS PRN
Qty: 90 TABLET | Refills: 0 | Status: SHIPPED | OUTPATIENT
Start: 2018-04-09 | End: 2018-05-03 | Stop reason: ALTCHOICE

## 2018-04-09 RX ORDER — LEVOTHYROXINE SODIUM 137 UG/1
137 TABLET ORAL
Qty: 90 TABLET | Refills: 0 | Status: SHIPPED | OUTPATIENT
Start: 2018-04-09 | End: 2018-07-05

## 2018-04-09 NOTE — PROGRESS NOTES
CC:  Derm Problem (c/c skin cracking on both hands x1 week)      Hx of CC:    1. Skin cracking:  Finger tips cracking and very dry  Using dry hand cream- 2-3 times a day  Unsure what caused this to get bad suddenly- works with hands-  Worried was al Levothyroxine Sodium 137 MCG Oral Tab Take 137 mcg by mouth before breakfast. Disp: 90 tablet Rfl: 0   metoprolol Tartrate 25 MG Oral Tab Take 1 tablet (25 mg total) by mouth 2 (two) times daily.  Disp: 180 tablet Rfl: 1   aspirin 81 MG Oral Chew Tab Chew cracked dry skin on a few finger tips and by nails. No swelling    EXT: no edema  MS: normal movement   + lipoma- mid left back, a few cms later to spine  NEURO: no gross deficits     Assessment and Plan:    1.  Dry skin dermatitis  Discussed moisturizer a

## 2018-04-09 NOTE — PATIENT INSTRUCTIONS
Apply triamcinolone twice daily  Moisturize  Avoid hot water  Apply triamcinolone and vaseoline and put saran wrap on after

## 2018-04-11 ENCOUNTER — APPOINTMENT (OUTPATIENT)
Dept: CT IMAGING | Facility: HOSPITAL | Age: 73
End: 2018-04-11
Attending: EMERGENCY MEDICINE
Payer: MEDICARE

## 2018-04-11 ENCOUNTER — HOSPITAL ENCOUNTER (EMERGENCY)
Facility: HOSPITAL | Age: 73
Discharge: LEFT BEFORE DISCHARGE | End: 2018-04-11
Attending: EMERGENCY MEDICINE
Payer: MEDICARE

## 2018-04-11 VITALS
WEIGHT: 150 LBS | HEIGHT: 65 IN | TEMPERATURE: 98 F | RESPIRATION RATE: 19 BRPM | OXYGEN SATURATION: 97 % | BODY MASS INDEX: 24.99 KG/M2 | SYSTOLIC BLOOD PRESSURE: 162 MMHG | DIASTOLIC BLOOD PRESSURE: 106 MMHG | HEART RATE: 99 BPM

## 2018-04-11 DIAGNOSIS — R51.9 ACUTE NONINTRACTABLE HEADACHE, UNSPECIFIED HEADACHE TYPE: Primary | ICD-10-CM

## 2018-04-11 PROCEDURE — 99282 EMERGENCY DEPT VISIT SF MDM: CPT

## 2018-04-11 RX ORDER — ACETAMINOPHEN 500 MG
1000 TABLET ORAL ONCE
Status: DISCONTINUED | OUTPATIENT
Start: 2018-04-11 | End: 2018-04-11

## 2018-04-11 NOTE — ED PROVIDER NOTES
Patient Seen in: [facility] AND [facility] Emergency Department    History   Patient presents with:  Pain (neurologic)    Stated Complaint: pain in multiple areas    HPI    64-year-old male presents for evaluation of headache.   Patient reports he developed a hea person, place, and time. He appears well-developed and well-nourished. No distress. HENT:   Head: Normocephalic and atraumatic.    Mouth/Throat: Oropharynx is clear and moist.   Eyes: Conjunctivae and EOM are normal. Pupils are equal, round, and reactive

## 2018-04-13 ENCOUNTER — OFFICE VISIT (OUTPATIENT)
Dept: PODIATRY CLINIC | Facility: CLINIC | Age: 73
End: 2018-04-13

## 2018-04-13 DIAGNOSIS — B35.1 ONYCHOMYCOSIS: Primary | ICD-10-CM

## 2018-04-13 DIAGNOSIS — M79.675 PAIN OF LEFT GREAT TOE: ICD-10-CM

## 2018-04-13 PROCEDURE — G0463 HOSPITAL OUTPT CLINIC VISIT: HCPCS | Performed by: PODIATRIST

## 2018-04-13 PROCEDURE — 99212 OFFICE O/P EST SF 10 MIN: CPT | Performed by: PODIATRIST

## 2018-04-13 NOTE — PROGRESS NOTES
HPI:    Patient ID: Adriana Cordova is a 74year old male. HPI  This 68-year-old male presents to the office having not been seen in approximately 1 year.   Patient states that he is here because now the left great toenail is causing pain and he would (25 mg total) by mouth 2 (two) times daily. Disp: 180 tablet Rfl: 1   triamcinolone acetonide 0.1 % External Cream Apply topically 2 (two) times daily as needed.  Disp: 60 g Rfl: 0     Allergies:  Dust Mites                Pollen                     PHYSICA

## 2018-04-14 DIAGNOSIS — G47.00 INSOMNIA, UNSPECIFIED TYPE: ICD-10-CM

## 2018-04-16 RX ORDER — TRAZODONE HYDROCHLORIDE 50 MG/1
TABLET ORAL
Qty: 30 TABLET | Refills: 5 | Status: SHIPPED | OUTPATIENT
Start: 2018-04-16 | End: 2018-09-04

## 2018-04-23 ENCOUNTER — OFFICE VISIT (OUTPATIENT)
Dept: INTERNAL MEDICINE CLINIC | Facility: CLINIC | Age: 73
End: 2018-04-23

## 2018-04-23 VITALS
HEART RATE: 74 BPM | BODY MASS INDEX: 25.49 KG/M2 | WEIGHT: 153 LBS | OXYGEN SATURATION: 99 % | RESPIRATION RATE: 18 BRPM | HEIGHT: 65 IN | DIASTOLIC BLOOD PRESSURE: 86 MMHG | SYSTOLIC BLOOD PRESSURE: 122 MMHG

## 2018-04-23 DIAGNOSIS — F41.9 ANXIETY: ICD-10-CM

## 2018-04-23 DIAGNOSIS — L02.92 BOIL: Primary | ICD-10-CM

## 2018-04-23 DIAGNOSIS — G47.00 INSOMNIA, UNSPECIFIED TYPE: ICD-10-CM

## 2018-04-23 DIAGNOSIS — N18.31 CHRONIC KIDNEY DISEASE (CKD) STAGE G3A/A1, MODERATELY DECREASED GLOMERULAR FILTRATION RATE (GFR) BETWEEN 45-59 ML/MIN/1.73 SQUARE METER AND ALBUMINURIA CREATININE RATIO LESS THAN 30 MG/G (HCC): ICD-10-CM

## 2018-04-23 PROCEDURE — 99213 OFFICE O/P EST LOW 20 MIN: CPT | Performed by: FAMILY MEDICINE

## 2018-04-23 RX ORDER — LORAZEPAM 1 MG/1
1 TABLET ORAL 2 TIMES DAILY
Qty: 60 TABLET | Refills: 2 | Status: SHIPPED | OUTPATIENT
Start: 2018-04-23 | End: 2018-10-11

## 2018-04-23 RX ORDER — DOXYCYCLINE HYCLATE 100 MG
100 TABLET ORAL 2 TIMES DAILY
Qty: 14 TABLET | Refills: 0 | Status: SHIPPED | OUTPATIENT
Start: 2018-04-23 | End: 2018-04-30

## 2018-04-23 NOTE — PROGRESS NOTES
CC:  Abscess (integumentary) (Pt presents to clinic for c/o boil on back on neck x 1 wk-->per pt wounds was open and feels fluid.)      Hx of CC:  1 WEEK WITH MINIMALLY TENDER RED BUMP ON LEFT SHOULDER AREA.     NEEDING REFILL ON ALPRAZOLAM FOR SLEEP AND EP

## 2018-04-27 RX ORDER — MELATONIN
325 2 TIMES DAILY WITH MEALS
Qty: 60 TABLET | Refills: 2 | Status: SHIPPED | OUTPATIENT
Start: 2018-04-27 | End: 2018-05-07

## 2018-05-01 ENCOUNTER — OFFICE VISIT (OUTPATIENT)
Dept: PODIATRY CLINIC | Facility: CLINIC | Age: 73
End: 2018-05-01

## 2018-05-01 VITALS — SYSTOLIC BLOOD PRESSURE: 182 MMHG | HEART RATE: 63 BPM | RESPIRATION RATE: 16 BRPM | DIASTOLIC BLOOD PRESSURE: 90 MMHG

## 2018-05-01 DIAGNOSIS — B35.1 ONYCHOMYCOSIS: Primary | ICD-10-CM

## 2018-05-01 DIAGNOSIS — M79.675 PAIN OF LEFT GREAT TOE: ICD-10-CM

## 2018-05-01 PROCEDURE — 11750 EXCISION NAIL&NAIL MATRIX: CPT | Performed by: PODIATRIST

## 2018-05-01 NOTE — PROGRESS NOTES
Pt's pre and post procedure BP was elevated. Pt states he did not take his BP medication today. Advised pt to take his BP medication. Discussed long term risks with having elevated BP with pt.  Advised pt to call Dr. Charis Beebe office to inform that BP is eleva

## 2018-05-01 NOTE — PROGRESS NOTES
HPI:    Patient ID: Guero Zurita is a 74year old male. HPI  This 47-year-old male presents for the purpose of total permanent nail removal left great toe. After review of the procedure patient signed a written consent.   Review of Systems  I revi Physical Exam  I localized the left great toe with Xylocaine and Marcaine and after the usual prep performed a total nail avulsion. Upon complete debridement of the nail phenol was applied to the matrix area followed by a Betadine ointment dressing.   He

## 2018-05-01 NOTE — PROGRESS NOTES
Per verbal order from [facility], draw up 1.5ml of 0.5% Marcaine and 1.5ml of 2% lidocaine for injection to left hallux.   Kendra Mccullough RN

## 2018-05-03 ENCOUNTER — OFFICE VISIT (OUTPATIENT)
Dept: INTERNAL MEDICINE CLINIC | Facility: CLINIC | Age: 73
End: 2018-05-03

## 2018-05-03 VITALS
HEART RATE: 74 BPM | RESPIRATION RATE: 16 BRPM | BODY MASS INDEX: 25.49 KG/M2 | SYSTOLIC BLOOD PRESSURE: 134 MMHG | OXYGEN SATURATION: 98 % | DIASTOLIC BLOOD PRESSURE: 82 MMHG | HEIGHT: 65 IN | WEIGHT: 153 LBS

## 2018-05-03 DIAGNOSIS — F41.9 ANXIETY: ICD-10-CM

## 2018-05-03 DIAGNOSIS — G57.62 MORTON'S NEUROMA OF LEFT FOOT: ICD-10-CM

## 2018-05-03 DIAGNOSIS — I10 ESSENTIAL HYPERTENSION: Primary | ICD-10-CM

## 2018-05-03 DIAGNOSIS — Z12.5 SCREENING PSA (PROSTATE SPECIFIC ANTIGEN): ICD-10-CM

## 2018-05-03 DIAGNOSIS — N18.31 CHRONIC KIDNEY DISEASE (CKD) STAGE G3A/A1, MODERATELY DECREASED GLOMERULAR FILTRATION RATE (GFR) BETWEEN 45-59 ML/MIN/1.73 SQUARE METER AND ALBUMINURIA CREATININE RATIO LESS THAN 30 MG/G (HCC): ICD-10-CM

## 2018-05-03 DIAGNOSIS — G89.29 CHRONIC BILATERAL LOW BACK PAIN WITHOUT SCIATICA: ICD-10-CM

## 2018-05-03 DIAGNOSIS — M54.50 CHRONIC BILATERAL LOW BACK PAIN WITHOUT SCIATICA: ICD-10-CM

## 2018-05-03 DIAGNOSIS — E03.9 ACQUIRED HYPOTHYROIDISM: ICD-10-CM

## 2018-05-03 PROCEDURE — 80048 BASIC METABOLIC PNL TOTAL CA: CPT | Performed by: FAMILY MEDICINE

## 2018-05-03 PROCEDURE — 99214 OFFICE O/P EST MOD 30 MIN: CPT | Performed by: FAMILY MEDICINE

## 2018-05-03 PROCEDURE — 84443 ASSAY THYROID STIM HORMONE: CPT | Performed by: FAMILY MEDICINE

## 2018-05-03 RX ORDER — SERTRALINE HYDROCHLORIDE 100 MG/1
100 TABLET, FILM COATED ORAL DAILY
Qty: 30 TABLET | Refills: 5 | Status: SHIPPED | OUTPATIENT
Start: 2018-05-03 | End: 2018-10-11

## 2018-05-03 RX ORDER — HYDROCODONE BITARTRATE AND ACETAMINOPHEN 5; 325 MG/1; MG/1
1 TABLET ORAL EVERY 8 HOURS PRN
Qty: 90 TABLET | Refills: 0 | Status: SHIPPED | OUTPATIENT
Start: 2018-05-03 | End: 2018-06-13

## 2018-05-03 RX ORDER — METOPROLOL TARTRATE 50 MG/1
50 TABLET, FILM COATED ORAL 2 TIMES DAILY
Qty: 60 TABLET | Refills: 5 | Status: SHIPPED | OUTPATIENT
Start: 2018-05-03 | End: 2018-05-17

## 2018-05-04 NOTE — PROGRESS NOTES
CC:  Blood Pressure (Has high BP reading at podiatrist's office-->advised to come in for f.up.)      Hx of CC:  BLOOD PRESSURE FLUCTUATES--WAS HIGH AT PODIATRIST'S OFFICE (180/90'S). ON LISINOPRIL AND METOPROLOL.     SOMEWHAT ANXIOUS, HAS RESTARTED SERTRAL foot  USE NORCO SPARINGLY, WOULD AVOID NSAIDS DUE TO CKD  - HYDROcodone-acetaminophen 5-325 MG Oral Tab; Take 1 tablet by mouth every 8 (eight) hours as needed for Pain. Dispense: 90 tablet; Refill: 0    7.  Chronic bilateral low back pain without sciatica

## 2018-05-07 RX ORDER — MELATONIN
325 2 TIMES DAILY WITH MEALS
Qty: 60 TABLET | Refills: 2 | Status: SHIPPED | OUTPATIENT
Start: 2018-05-07 | End: 2018-06-13

## 2018-05-15 ENCOUNTER — OFFICE VISIT (OUTPATIENT)
Dept: PODIATRY CLINIC | Facility: CLINIC | Age: 73
End: 2018-05-15

## 2018-05-15 DIAGNOSIS — B35.1 ONYCHOMYCOSIS: Primary | ICD-10-CM

## 2018-05-15 PROCEDURE — G0463 HOSPITAL OUTPT CLINIC VISIT: HCPCS | Performed by: PODIATRIST

## 2018-05-15 PROCEDURE — 99212 OFFICE O/P EST SF 10 MIN: CPT | Performed by: PODIATRIST

## 2018-05-15 NOTE — PROGRESS NOTES
HPI:    Patient ID: Maria Luisa Rodriguez is a 74year old male. HPI  68-year-old male presents 1 week post total nail removal of the left great toe. Patient has no noted specific concerns or problems.   Review of Systems         Current Outpatient Prescri Continue local care and will reevaluate in about 3 weeks         ASSESSMENT/PLAN:   Onychomycosis  (primary encounter diagnosis)    No orders of the defined types were placed in this encounter.       Meds This Visit:  No prescriptions requested or ordered

## 2018-05-17 DIAGNOSIS — I10 ESSENTIAL HYPERTENSION: ICD-10-CM

## 2018-05-17 RX ORDER — METOPROLOL TARTRATE 50 MG/1
50 TABLET, FILM COATED ORAL 2 TIMES DAILY
Qty: 60 TABLET | Refills: 5 | Status: SHIPPED | OUTPATIENT
Start: 2018-05-17 | End: 2018-06-04

## 2018-06-04 ENCOUNTER — OFFICE VISIT (OUTPATIENT)
Dept: INTERNAL MEDICINE CLINIC | Facility: CLINIC | Age: 73
End: 2018-06-04

## 2018-06-04 VITALS
RESPIRATION RATE: 13 BRPM | WEIGHT: 143 LBS | HEART RATE: 82 BPM | HEIGHT: 65 IN | SYSTOLIC BLOOD PRESSURE: 142 MMHG | TEMPERATURE: 98 F | BODY MASS INDEX: 23.82 KG/M2 | DIASTOLIC BLOOD PRESSURE: 84 MMHG | OXYGEN SATURATION: 98 %

## 2018-06-04 DIAGNOSIS — M54.50 CHRONIC BILATERAL LOW BACK PAIN WITHOUT SCIATICA: ICD-10-CM

## 2018-06-04 DIAGNOSIS — G89.29 CHRONIC BILATERAL LOW BACK PAIN WITHOUT SCIATICA: ICD-10-CM

## 2018-06-04 DIAGNOSIS — G57.62 NEUROMA OF SECOND INTERSPACE OF LEFT FOOT: Primary | ICD-10-CM

## 2018-06-04 DIAGNOSIS — I10 ESSENTIAL HYPERTENSION: ICD-10-CM

## 2018-06-04 PROCEDURE — 99214 OFFICE O/P EST MOD 30 MIN: CPT | Performed by: FAMILY MEDICINE

## 2018-06-04 RX ORDER — METOPROLOL TARTRATE 50 MG/1
50 TABLET, FILM COATED ORAL 2 TIMES DAILY
Qty: 60 TABLET | Refills: 5 | Status: SHIPPED | OUTPATIENT
Start: 2018-06-04 | End: 2018-06-14

## 2018-06-04 RX ORDER — HYDROCODONE BITARTRATE AND ACETAMINOPHEN 5; 325 MG/1; MG/1
1 TABLET ORAL EVERY 8 HOURS PRN
Qty: 90 TABLET | Refills: 0 | Status: SHIPPED | OUTPATIENT
Start: 2018-06-04 | End: 2018-07-04

## 2018-06-04 RX ORDER — HYDROCODONE BITARTRATE AND ACETAMINOPHEN 5; 325 MG/1; MG/1
1 TABLET ORAL EVERY 8 HOURS PRN
Qty: 90 TABLET | Refills: 0 | Status: SHIPPED | OUTPATIENT
Start: 2018-06-04 | End: 2018-06-13

## 2018-06-04 NOTE — PROGRESS NOTES
CC:  Foot Pain (c/c left foot pain since this morning)      Hx of CC:    Here for foot pain-  Woke up 3 days ago with it and couldn't walk  Feels ball at bottom of foot  Has had pain in similar spot before  No swelling or redness   No injury, did not step tablet (75 mg total) by mouth 2 (two) times daily. Disp: 60 tablet Rfl: 3   triamcinolone acetonide 0.1 % External Cream Apply topically 2 (two) times daily as needed.  Disp: 60 g Rfl: 0        History:  Past Medical History:   Diagnosis Date   • Allergic r second interspace of left foot  Suspect neuroma - has been dx with this before although last time was easier to palpate   Is improving  Avoid nsaids 2/2 CKD  - PODIATRY - INTERNAL    2.  Chronic bilateral low back pain without sciatica  Has done PT and seen

## 2018-06-13 ENCOUNTER — HOSPITAL ENCOUNTER (OUTPATIENT)
Age: 73
Discharge: HOME | End: 2018-06-13
Attending: FAMILY MEDICINE
Payer: MEDICARE

## 2018-06-13 VITALS
SYSTOLIC BLOOD PRESSURE: 144 MMHG | DIASTOLIC BLOOD PRESSURE: 88 MMHG | RESPIRATION RATE: 16 BRPM | HEART RATE: 64 BPM | WEIGHT: 142 LBS | HEIGHT: 65 IN | TEMPERATURE: 98 F | BODY MASS INDEX: 23.66 KG/M2 | OXYGEN SATURATION: 100 %

## 2018-06-13 DIAGNOSIS — R10.30 LOWER ABDOMINAL PAIN: Primary | ICD-10-CM

## 2018-06-13 DIAGNOSIS — G44.209 TENSION HEADACHE: ICD-10-CM

## 2018-06-13 PROCEDURE — 99212 OFFICE O/P EST SF 10 MIN: CPT

## 2018-06-13 PROCEDURE — 99213 OFFICE O/P EST LOW 20 MIN: CPT

## 2018-06-13 NOTE — ED PROVIDER NOTES
Patient Seen in: [address]    History   CC:  Patient presents with:  Pain    Stated Complaint: eye and groin pain    ------------------------------  Per Rn: (paraphrase)  Pt presents w/co both groin and lower abd pain a follow up care with a primary care provider within the next three months to obtain basic health screening including reassessment of your blood pressure.       General Appearance:    Alert, cooperative, no distress, appears stated age   Head:    Normocephali
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
## 2018-06-14 DIAGNOSIS — I10 ESSENTIAL HYPERTENSION: ICD-10-CM

## 2018-06-14 RX ORDER — METOPROLOL TARTRATE 50 MG/1
50 TABLET, FILM COATED ORAL 2 TIMES DAILY
Qty: 60 TABLET | Refills: 5 | Status: SHIPPED | OUTPATIENT
Start: 2018-06-14 | End: 2019-08-26

## 2018-06-15 ENCOUNTER — OFFICE VISIT (OUTPATIENT)
Dept: PODIATRY CLINIC | Facility: CLINIC | Age: 73
End: 2018-06-15

## 2018-06-15 VITALS — DIASTOLIC BLOOD PRESSURE: 64 MMHG | SYSTOLIC BLOOD PRESSURE: 102 MMHG | HEART RATE: 57 BPM

## 2018-06-15 DIAGNOSIS — B35.1 ONYCHOMYCOSIS: Primary | ICD-10-CM

## 2018-06-15 PROCEDURE — 99212 OFFICE O/P EST SF 10 MIN: CPT | Performed by: PODIATRIST

## 2018-06-15 PROCEDURE — G0463 HOSPITAL OUTPT CLINIC VISIT: HCPCS | Performed by: PODIATRIST

## 2018-06-15 NOTE — PROGRESS NOTES
HPI:    Patient ID: Sowmya Zuniga is a 74year old male. HPI  49-year-old male presents after permanent nail removal of the left great toe approximately a month and a half ago. He has no noted concerns or problems.   Review of Systems  Did review m

## 2018-07-05 DIAGNOSIS — E03.9 HYPOTHYROIDISM, UNSPECIFIED TYPE: ICD-10-CM

## 2018-07-05 DIAGNOSIS — I10 ESSENTIAL HYPERTENSION: ICD-10-CM

## 2018-07-05 RX ORDER — LISINOPRIL 20 MG/1
20 TABLET ORAL DAILY
Qty: 90 TABLET | Refills: 0 | Status: SHIPPED | OUTPATIENT
Start: 2018-07-05 | End: 2020-11-25 | Stop reason: ALTCHOICE

## 2018-07-05 RX ORDER — LEVOTHYROXINE SODIUM 137 UG/1
TABLET ORAL
Qty: 90 TABLET | Refills: 0 | Status: SHIPPED | OUTPATIENT
Start: 2018-07-05 | End: 2018-09-03

## 2018-07-12 DIAGNOSIS — G47.00 INSOMNIA, UNSPECIFIED TYPE: ICD-10-CM

## 2018-07-12 DIAGNOSIS — F41.9 ANXIETY: ICD-10-CM

## 2018-07-12 RX ORDER — LORAZEPAM 1 MG/1
1 TABLET ORAL 2 TIMES DAILY
Qty: 60 TABLET | Refills: 2 | Status: CANCELLED | OUTPATIENT
Start: 2018-07-12

## 2018-07-12 NOTE — TELEPHONE ENCOUNTER
Priscila Waldron would like a refill of his Lorazepam prescription so he does not run out of meds. Robert Devries

## 2018-07-14 ENCOUNTER — OFFICE VISIT (OUTPATIENT)
Dept: INTERNAL MEDICINE CLINIC | Facility: CLINIC | Age: 73
End: 2018-07-14

## 2018-07-14 VITALS
RESPIRATION RATE: 13 BRPM | DIASTOLIC BLOOD PRESSURE: 84 MMHG | BODY MASS INDEX: 23.32 KG/M2 | HEART RATE: 64 BPM | TEMPERATURE: 98 F | OXYGEN SATURATION: 98 % | SYSTOLIC BLOOD PRESSURE: 126 MMHG | WEIGHT: 140 LBS | HEIGHT: 65 IN

## 2018-07-14 DIAGNOSIS — S99.921A INJURY OF RIGHT FOOT, INITIAL ENCOUNTER: ICD-10-CM

## 2018-07-14 DIAGNOSIS — R91.1 LUNG NODULE, SOLITARY: ICD-10-CM

## 2018-07-14 DIAGNOSIS — I10 ESSENTIAL HYPERTENSION: Primary | ICD-10-CM

## 2018-07-14 DIAGNOSIS — F41.9 ANXIETY: ICD-10-CM

## 2018-07-14 PROCEDURE — 99214 OFFICE O/P EST MOD 30 MIN: CPT | Performed by: FAMILY MEDICINE

## 2018-07-14 RX ORDER — LORAZEPAM 1 MG/1
1 TABLET ORAL EVERY 6 HOURS PRN
Qty: 60 TABLET | Refills: 0 | Status: SHIPPED | OUTPATIENT
Start: 2018-07-16 | End: 2018-10-11

## 2018-07-14 RX ORDER — ZOLPIDEM TARTRATE 5 MG/1
TABLET ORAL
Refills: 0 | COMMUNITY
Start: 2018-06-25 | End: 2018-08-30 | Stop reason: DRUGHIGH

## 2018-07-14 NOTE — PATIENT INSTRUCTIONS
Please ask psychiatrist to take over prescription for lorazepam as I am afraid you are needing too much and should switch to other anxiety medicine.     Do not take lorazepam and ambien together

## 2018-07-14 NOTE — PROGRESS NOTES
CC:  Foot Injury (right foot injury, per patient a weight fell on top of right pinky toe 4 days ago)      Hx of CC:      HTN:  Follow up BP- high last visit  Taking metoprolol twice daily    Here for foot injury  Weight fell on R foot  Bumped R foot into w areata    • Esophageal reflux    • Hypothyroid    • Unspecified essential hypertension       No past surgical history on file.    Family History   Problem Relation Age of Onset   • Other [OTHER] Father      unknown caused   • Other Vara Sermon Mother      Susanla Flavin added ambien. Have been working on cutting down ativan with some success. Asked pt to get further refills after today from psychiatrist. To only take if panic attack  Discussed NOT to take with ambien and also not to climb ladders/ drive after taking.  Pt v

## 2018-07-23 ENCOUNTER — HOSPITAL ENCOUNTER (OUTPATIENT)
Dept: GENERAL RADIOLOGY | Facility: HOSPITAL | Age: 73
Discharge: HOME | End: 2018-07-23
Attending: FAMILY MEDICINE
Payer: MEDICARE

## 2018-07-23 DIAGNOSIS — S99.921A INJURY OF RIGHT FOOT, INITIAL ENCOUNTER: ICD-10-CM

## 2018-07-23 PROCEDURE — 73630 X-RAY EXAM OF FOOT: CPT | Performed by: FAMILY MEDICINE

## 2018-08-30 ENCOUNTER — OFFICE VISIT (OUTPATIENT)
Dept: INTERNAL MEDICINE CLINIC | Facility: CLINIC | Age: 73
End: 2018-08-30
Payer: MEDICARE

## 2018-08-30 VITALS
DIASTOLIC BLOOD PRESSURE: 72 MMHG | HEART RATE: 64 BPM | BODY MASS INDEX: 23.66 KG/M2 | SYSTOLIC BLOOD PRESSURE: 116 MMHG | TEMPERATURE: 98 F | OXYGEN SATURATION: 99 % | WEIGHT: 142 LBS | HEIGHT: 65 IN

## 2018-08-30 DIAGNOSIS — M54.50 CHRONIC BILATERAL LOW BACK PAIN WITHOUT SCIATICA: ICD-10-CM

## 2018-08-30 DIAGNOSIS — G89.29 CHRONIC BILATERAL LOW BACK PAIN WITHOUT SCIATICA: ICD-10-CM

## 2018-08-30 DIAGNOSIS — F41.9 ANXIETY: ICD-10-CM

## 2018-08-30 DIAGNOSIS — I10 ESSENTIAL HYPERTENSION: Primary | ICD-10-CM

## 2018-08-30 DIAGNOSIS — R91.1 LUNG NODULE, SOLITARY: ICD-10-CM

## 2018-08-30 PROCEDURE — 99214 OFFICE O/P EST MOD 30 MIN: CPT | Performed by: FAMILY MEDICINE

## 2018-08-30 RX ORDER — ALPRAZOLAM 0.5 MG/1
0.5 TABLET ORAL 2 TIMES DAILY PRN
Qty: 60 TABLET | Refills: 1 | Status: SHIPPED | OUTPATIENT
Start: 2018-08-30 | End: 2018-10-11

## 2018-08-30 RX ORDER — ZOLPIDEM TARTRATE 10 MG/1
TABLET ORAL
Refills: 4 | Status: ON HOLD | COMMUNITY
Start: 2018-08-25 | End: 2020-08-16

## 2018-08-30 RX ORDER — GABAPENTIN 600 MG/1
600 TABLET ORAL 2 TIMES DAILY
Refills: 0 | COMMUNITY
Start: 2018-08-25 | End: 2020-08-12

## 2018-08-30 NOTE — PROGRESS NOTES
CC:  Medication Request (Patient is requesting medication to stop the cravings)      Hx of CC:    Hx anxiety, chronic back pain, insomnia    A few days ago stopped norco and ativan   Takes zolpidem to sleep   Is seeing counselor and psychiatrist   Was Gus Zaldivar Zollie Ports Mother      unknown caused   • Other Zollie Ports Maternal Grandmother    • Other Zollie Ports Maternal Grandfather    • Other Zollie Ports Paternal Grandmother    • Other [OTHER] Paternal Grandfather         Relation Status Comments   Fa     Mo  Jan Sweeney) 0.5 MG Oral Tab; Take 1 tablet (0.5 mg total) by mouth 2 (two) times daily as needed for Anxiety. Dispense: 60 tablet; Refill: 1    3.  Chronic bilateral low back pain without sciatica  Off norco now  Pain ok  Will cont home exercises  F.u if pain

## 2018-09-03 DIAGNOSIS — E03.9 HYPOTHYROIDISM, UNSPECIFIED TYPE: ICD-10-CM

## 2018-09-04 DIAGNOSIS — G47.00 INSOMNIA, UNSPECIFIED TYPE: ICD-10-CM

## 2018-09-04 RX ORDER — TRAZODONE HYDROCHLORIDE 50 MG/1
50 TABLET ORAL NIGHTLY PRN
Qty: 30 TABLET | Refills: 3 | Status: SHIPPED | OUTPATIENT
Start: 2018-09-04 | End: 2018-09-12

## 2018-09-04 RX ORDER — LEVOTHYROXINE SODIUM 137 UG/1
TABLET ORAL
Qty: 90 TABLET | Refills: 0 | Status: SHIPPED | OUTPATIENT
Start: 2018-09-04 | End: 2018-12-01

## 2018-09-04 NOTE — TELEPHONE ENCOUNTER
A refill request was received for:    Pending Prescriptions Disp Refills    TRAZODONE HCL 50 MG Oral Tab [Pharmacy Med Name: TRAZODONE 50 MG TABLET] 30 tablet 5     Sig: TAKE 1 TABLET BY MOUTH NIGHTLY           Last office visit: 6/2018      Future Appointments  Date Time Provider Tres Hand   9/5/2018 3:00 PM [facility] CT RM1 [facility] CT SCAN EM [facility]

## 2018-09-05 ENCOUNTER — HOSPITAL ENCOUNTER (OUTPATIENT)
Dept: CT IMAGING | Facility: HOSPITAL | Age: 73
Discharge: HOME | End: 2018-09-05
Attending: FAMILY MEDICINE
Payer: MEDICARE

## 2018-09-05 ENCOUNTER — HOSPITAL ENCOUNTER (EMERGENCY)
Facility: HOSPITAL | Age: 73
Discharge: HOME | End: 2018-09-05
Payer: MEDICARE

## 2018-09-05 VITALS
DIASTOLIC BLOOD PRESSURE: 80 MMHG | TEMPERATURE: 98 F | HEIGHT: 65 IN | RESPIRATION RATE: 18 BRPM | SYSTOLIC BLOOD PRESSURE: 131 MMHG | BODY MASS INDEX: 23.32 KG/M2 | OXYGEN SATURATION: 99 % | WEIGHT: 140 LBS | HEART RATE: 51 BPM
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
## 2018-09-05 DIAGNOSIS — S50.812A CAT SCRATCH OF FOREARM, LEFT, INITIAL ENCOUNTER: Primary | ICD-10-CM

## 2018-09-05 DIAGNOSIS — R91.1 LUNG NODULE, SOLITARY: ICD-10-CM

## 2018-09-05 PROCEDURE — 71250 CT THORAX DX C-: CPT | Performed by: FAMILY MEDICINE

## 2018-09-05 PROCEDURE — 99283 EMERGENCY DEPT VISIT LOW MDM: CPT

## 2018-09-05 RX ORDER — AMOXICILLIN AND CLAVULANATE POTASSIUM 875; 125 MG/1; MG/1
1 TABLET, FILM COATED ORAL 2 TIMES DAILY
Qty: 10 TABLET | Refills: 0 | Status: SHIPPED | OUTPATIENT
Start: 2018-09-05 | End: 2018-09-10

## 2018-09-05 NOTE — ED NOTES
Pt states was scratched by a 3month old kitten, up to date on shots. Has 2 lacerations to lt forearm. Bleeding controlled.

## 2018-09-06 NOTE — ED PROVIDER NOTES
Patient Seen in: [facility] AND [facility] Emergency Department    History   Patient presents with:  Laceration Abrasion (integumentary)    Stated Complaint: left arm cat scratch     HPI    57-year-old male, with a history of reflux and hypertension, presents to are 2 parallel 2.5 centimeter vertical abrasions-to the dorsal left distal forearm no bleeding   Nursing note and vitals reviewed.            ED Course   Labs Reviewed - No data to display    ED Course as of Sep 05 2238  ------------------------------------

## 2018-09-07 PROBLEM — K44.9 HIATAL HERNIA: Status: ACTIVE | Noted: 2018-09-07

## 2018-09-12 DIAGNOSIS — G47.00 INSOMNIA, UNSPECIFIED TYPE: ICD-10-CM

## 2018-09-12 RX ORDER — TRAZODONE HYDROCHLORIDE 50 MG/1
TABLET ORAL
Qty: 30 TABLET | Refills: 5 | Status: SHIPPED | OUTPATIENT
Start: 2018-09-12 | End: 2018-10-11

## 2018-10-11 NOTE — PROGRESS NOTES
CC:  Medication Follow-Up      Hx of CC:    Hx anxiety, chronic back pain, insomnia,     Here for ER f/u    Was recently in [facility] in [place] for CP- was told acid reflux or anxiety attack-   Started after spicey meal of tacos  Had stress te 1 tablet (50 mg total) by mouth 2 (two) times daily. Disp: 60 tablet Rfl: 5   Sertraline HCl 100 MG Oral Tab Take 1 tablet (100 mg total) by mouth daily.  Disp: 30 tablet Rfl: 5        History:  Past Medical History:   Diagnosis Date   • Allergic rhinitis attack and these medications and addictive and tolerance can develop. If patient is needing this medication too much, a daily medication or change in their medication will be needed to help control their anxiety. Pt agrees to this.    He needs to f/u with h

## 2018-10-24 PROCEDURE — 84439 ASSAY OF FREE THYROXINE: CPT | Performed by: FAMILY MEDICINE

## 2018-10-24 PROCEDURE — 84443 ASSAY THYROID STIM HORMONE: CPT | Performed by: FAMILY MEDICINE

## 2018-10-24 NOTE — PROGRESS NOTES
CC:  Insomnia (c/c insomnia follow up )      Hx of CC:    Hx anxiety, chronic back pain, insomnia,     Seen 2 weeks ago  Recommend pt go back to psychiatrist     Sees psychiatrist- Dr Valentino Jumbo did not call to make appt     C/ unknown caused   • Other (Other) Mother         unknown caused   • Other (Other) Maternal Grandmother    • Other (Other) Maternal Grandfather    • Other (Other) Paternal Grandmother    • Other (Other) Paternal Grandfather       Family Status   Relation S hallucinations    -  NAVIGATOR    3. Attention deficit    -  NAVIGATOR            There are no diagnoses linked to this encounter. None  No orders of the defined types were placed in this encounter.

## 2018-10-25 NOTE — PROGRESS NOTES
D/w pt, lower med dose to 125, recheck 6 weeks  Also, pt did see psychiatrist today and doing better

## 2018-11-13 DIAGNOSIS — F41.9 ANXIETY: ICD-10-CM

## 2018-11-14 DIAGNOSIS — F41.9 ANXIETY: ICD-10-CM

## 2018-11-15 RX ORDER — ALPRAZOLAM 0.5 MG/1
0.5 TABLET ORAL 2 TIMES DAILY PRN
Qty: 60 TABLET | Refills: 0 | OUTPATIENT
Start: 2018-11-15

## 2018-11-16 RX ORDER — ALPRAZOLAM 0.5 MG/1
TABLET ORAL
Qty: 60 TABLET | Refills: 0 | Status: ON HOLD | OUTPATIENT
Start: 2018-11-16 | End: 2020-08-16

## 2018-11-16 NOTE — TELEPHONE ENCOUNTER
Now that pt seeing psychiatrist, He should gets these meds from his psychiatrist. Can he please do that?

## 2018-11-18 DIAGNOSIS — R44.3 HALLUCINATIONS: ICD-10-CM

## 2018-11-20 RX ORDER — RISPERIDONE 0.5 MG/1
0.5 TABLET ORAL NIGHTLY
Qty: 30 TABLET | Refills: 0 | OUTPATIENT
Start: 2018-11-20

## 2018-12-01 DIAGNOSIS — E03.9 HYPOTHYROIDISM, UNSPECIFIED TYPE: ICD-10-CM

## 2018-12-01 RX ORDER — LEVOTHYROXINE SODIUM 137 UG/1
TABLET ORAL
Qty: 30 TABLET | Refills: 0 | Status: SHIPPED | OUTPATIENT
Start: 2018-12-01 | End: 2018-12-19

## 2018-12-01 NOTE — TELEPHONE ENCOUNTER
Last OV 10/24/18. Future appt scheduled 12/17/18.     Thyroid Supplements Protocol Feiopm59/1 12:15 AM   TSH value between 0.350 and 5.500 IU/ml

## 2018-12-02 NOTE — TELEPHONE ENCOUNTER
Please tell pt I did approve but he needs to get his blood drawn to check his thyroid level, he is overdue and his last one was not normal range. I only gave 30 days.  Thank you

## 2018-12-07 ENCOUNTER — OFFICE VISIT (OUTPATIENT)
Dept: OTOLARYNGOLOGY | Facility: CLINIC | Age: 73
End: 2018-12-07
Payer: MEDICARE

## 2018-12-07 VITALS
TEMPERATURE: 99 F | HEIGHT: 65 IN | BODY MASS INDEX: 23.32 KG/M2 | SYSTOLIC BLOOD PRESSURE: 127 MMHG | DIASTOLIC BLOOD PRESSURE: 79 MMHG | WEIGHT: 140 LBS

## 2018-12-07 DIAGNOSIS — J34.0 NASAL SEPTUM ULCERATION: Primary | ICD-10-CM

## 2018-12-07 PROCEDURE — G0463 HOSPITAL OUTPT CLINIC VISIT: HCPCS | Performed by: OTOLARYNGOLOGY

## 2018-12-07 PROCEDURE — 99203 OFFICE O/P NEW LOW 30 MIN: CPT | Performed by: OTOLARYNGOLOGY

## 2018-12-08 NOTE — PROGRESS NOTES
Maria Luisa Rodriguez is a 74year old male. Patient presents with:  Nose Problem: pt reports a sore inside left nostril, feels swollen, nasal drip     HPI:   For the last 1-2 months he has been experiencing a blockage inside his nose.   He is felt a sore that weight gain  SKIN: denies any unusual skin lesions or rashes  RESPIRATORY: denies shortness of breath with exertion  NEURO: denies headaches    EXAM:   /79 (BP Location: Left arm, Patient Position: Sitting, Cuff Size: adult)   Temp 98.9 °F (37.2 °C)

## 2018-12-17 ENCOUNTER — OFFICE VISIT (OUTPATIENT)
Dept: INTERNAL MEDICINE CLINIC | Facility: CLINIC | Age: 73
End: 2018-12-17
Payer: MEDICARE

## 2018-12-17 VITALS
HEIGHT: 65 IN | HEART RATE: 67 BPM | WEIGHT: 140.38 LBS | BODY MASS INDEX: 23.39 KG/M2 | DIASTOLIC BLOOD PRESSURE: 70 MMHG | OXYGEN SATURATION: 99 % | SYSTOLIC BLOOD PRESSURE: 124 MMHG

## 2018-12-17 DIAGNOSIS — H53.9 VISION CHANGES: ICD-10-CM

## 2018-12-17 DIAGNOSIS — Z12.11 SCREENING FOR COLON CANCER: ICD-10-CM

## 2018-12-17 DIAGNOSIS — Z00.00 MEDICARE ANNUAL WELLNESS VISIT, SUBSEQUENT: Primary | ICD-10-CM

## 2018-12-17 DIAGNOSIS — J30.89 ALLERGIC RHINITIS DUE TO OTHER ALLERGIC TRIGGER, UNSPECIFIED SEASONALITY: ICD-10-CM

## 2018-12-17 DIAGNOSIS — Z23 NEED FOR STREPTOCOCCUS PNEUMONIAE VACCINATION: ICD-10-CM

## 2018-12-17 PROCEDURE — 84443 ASSAY THYROID STIM HORMONE: CPT | Performed by: FAMILY MEDICINE

## 2018-12-17 PROCEDURE — G0439 PPPS, SUBSEQ VISIT: HCPCS | Performed by: FAMILY MEDICINE

## 2018-12-17 PROCEDURE — 85025 COMPLETE CBC W/AUTO DIFF WBC: CPT | Performed by: FAMILY MEDICINE

## 2018-12-17 PROCEDURE — 84480 ASSAY TRIIODOTHYRONINE (T3): CPT | Performed by: FAMILY MEDICINE

## 2018-12-17 PROCEDURE — 84439 ASSAY OF FREE THYROXINE: CPT | Performed by: FAMILY MEDICINE

## 2018-12-17 RX ORDER — FLUTICASONE PROPIONATE 50 MCG
2 SPRAY, SUSPENSION (ML) NASAL DAILY
Qty: 3 BOTTLE | Refills: 3 | Status: SHIPPED | OUTPATIENT
Start: 2018-12-17 | End: 2019-12-12

## 2018-12-17 NOTE — PROGRESS NOTES
HPI:   Shirley Lambert is a 74year old male who presents for a Medicare Annual Wellness visit.     Patient Active Problem List:     Hypertension     Allergic rhinitis     Hypothyroid     Insomnia     Hypertriglyceridemia     Chronic gout     Okeefe's Problems? : No    Difficulty walking?: No    Difficulty dressing or bathing?: No    Problems with daily activities? : No    Memory Problems?: No      Fall/Risk Assessment          Have you fallen in the last 12 months?: 0-No by mouth 2 (two) times daily. Disp:  Rfl: 0   Zolpidem Tartrate 10 MG Oral Tab TAKE 1 TABLET BY MOUTH EVERYDAY AT BEDTIME Disp:  Rfl: 4   LISINOPRIL 20 MG Oral Tab TAKE 1 TABLET (20 MG TOTAL) BY MOUTH DAILY.  Disp: 90 tablet Rfl: 0   Metoprolol Tartrate 50 oz  12/07/18 : 140 lb  10/24/18 : 140 lb  10/11/18 : 140 lb  09/05/18 : 140 lb  08/30/18 : 142 lb      > BP Readings from Last 3 Encounters:  12/17/18 : 124/70  12/07/18 : 127/79  10/24/18 : 138/70      GENERAL: well developed, well nourished, in no appare 3    PLAN SUMMARY:   Diagnoses and all orders for this visit:    Medicare annual wellness visit, subsequent  -     PNEUMOCOCCAL VACC, 13 TIFFANI IM  -     CBC WITH DIFFERENTIAL WITH PLATELET;  Future  -     TSH W REFLEX TO FREE T4; Future  -     CBC W/ DIFFEREN PNEUMOCOCCAL VACC, 13 TIFFANI IM   Orders placed or performed in visit on 03/10/15   • PNEUMOCOCCAL IMMUNIZATION    Update Immunization Activity if applicable    Hepatitis B No orders found for this or any previous visit.  Update Immunization Activity if applic HIGH DOSE FLU 65 YRS AND OLDER PRSV FREE SINGLE D (80289) FLU CLINIC                          11/16/2016      Pneumovax 23          03/10/2015      TDAP                  03/28/2018    Pended                  Date(s) Pended    Pneumococcal (Prevnar 13)

## 2018-12-19 DIAGNOSIS — E03.9 HYPOTHYROIDISM, UNSPECIFIED TYPE: Primary | ICD-10-CM

## 2018-12-19 RX ORDER — LEVOTHYROXINE SODIUM 125 UG/1
1 CAPSULE ORAL DAILY
Qty: 30 CAPSULE | Refills: 1 | Status: SHIPPED | OUTPATIENT
Start: 2018-12-19 | End: 2019-01-03

## 2019-01-03 DIAGNOSIS — E03.9 HYPOTHYROIDISM, UNSPECIFIED TYPE: ICD-10-CM

## 2019-01-03 RX ORDER — LEVOTHYROXINE SODIUM 137 UG/1
TABLET ORAL
Qty: 30 TABLET | Refills: 0 | OUTPATIENT
Start: 2019-01-03

## 2019-01-03 NOTE — TELEPHONE ENCOUNTER
Last OV 12/17/18. No future appt scheduled.       Thyroid Supplements Protocol Failed1/3 1:13 AM   TSH value between 0.350 and 5.500 IU/ml

## 2019-01-24 ENCOUNTER — OFFICE VISIT (OUTPATIENT)
Dept: OPTOMETRY | Facility: CLINIC | Age: 74
End: 2019-01-24
Payer: MEDICARE

## 2019-01-24 DIAGNOSIS — H52.4 PRESBYOPIA: ICD-10-CM

## 2019-01-24 DIAGNOSIS — H25.12 AGE-RELATED NUCLEAR CATARACT OF LEFT EYE: Primary | ICD-10-CM

## 2019-01-24 DIAGNOSIS — H02.889 MEIBOMIAN GLAND DYSFUNCTION (MGD): ICD-10-CM

## 2019-01-24 PROCEDURE — 92004 COMPRE OPH EXAM NEW PT 1/>: CPT | Performed by: OPTOMETRIST

## 2019-01-24 PROCEDURE — 92015 DETERMINE REFRACTIVE STATE: CPT | Performed by: OPTOMETRIST

## 2019-01-24 RX ORDER — HYDROXYZINE PAMOATE 50 MG/1
CAPSULE ORAL
Refills: 2 | Status: ON HOLD | COMMUNITY
Start: 2018-11-15 | End: 2020-08-16

## 2019-01-24 NOTE — PROGRESS NOTES
Shirley Curran is a 74year old male. HPI:     HPI     Patient is in for an annual eye eye exam. Last EE was two years ago in [place] and was told he needed only  OTC readers.  He had cataract surgery OD a few years ago at [facility] with  mouth 2 (two) times daily. Disp:  Rfl: 0   Zolpidem Tartrate 10 MG Oral Tab TAKE 1 TABLET BY MOUTH EVERYDAY AT BEDTIME Disp:  Rfl: 4   LISINOPRIL 20 MG Oral Tab TAKE 1 TABLET (20 MG TOTAL) BY MOUTH DAILY.  Disp: 90 tablet Rfl: 0   Metoprolol Tartrate 50 MG chamber intraocular lens with clear posterior capsule 2+ Nuclear sclerosis    Vitreous Clear Clear          Fundus Exam       Right Left    Disc Normal Normal    C/D Ratio 0.4 0.4    Macula Normal Normal    Vessels Normal Normal            Refraction     W

## 2019-01-24 NOTE — ASSESSMENT & PLAN NOTE
New glasses RX given to update as needed. Patient can either get FT bifocals or single vision for distance and continue to use his OTC reading glasses.

## 2019-01-24 NOTE — PATIENT INSTRUCTIONS
Presbyopia  New glasses RX given to update as needed. Patient can either get FT bifocals or single vision for distance and continue to use his OTC reading glasses.       Meibomian gland dysfunction (MGD)  I instructed patient to use warm compresses to keep

## 2019-01-24 NOTE — PROGRESS NOTES
Sissy Mei is a 74year old male. HPI:     HPI     Patient is in for an annual eye eye exam. Last EE was two years ago in [place] [address] and was told he needed only  OTC readers.  He had cataract surgery OD two years ago at [facility] with Dr. Preeti Devries mouth 2 (two) times daily. Disp:  Rfl: 0   Zolpidem Tartrate 10 MG Oral Tab TAKE 1 TABLET BY MOUTH EVERYDAY AT BEDTIME Disp:  Rfl: 4   LISINOPRIL 20 MG Oral Tab TAKE 1 TABLET (20 MG TOTAL) BY MOUTH DAILY.  Disp: 90 tablet Rfl: 0   Metoprolol Tartrate 50 MG chamber intraocular lens with clear posterior capsule 2+ Nuclear sclerosis    Vitreous Clear Clear          Fundus Exam       Right Left    Disc Normal Normal    C/D Ratio 0.4 0.4    Macula Normal Normal    Vessels Normal Normal            Refraction     W

## 2019-02-15 DIAGNOSIS — E03.9 HYPOTHYROIDISM, UNSPECIFIED TYPE: ICD-10-CM

## 2019-02-15 RX ORDER — LEVOTHYROXINE SODIUM 0.12 MG/1
125 TABLET ORAL
Qty: 30 TABLET | Refills: 1 | Status: SHIPPED | OUTPATIENT
Start: 2019-02-15 | End: 2019-06-12

## 2019-05-30 ENCOUNTER — TELEPHONE (OUTPATIENT)
Dept: INTERNAL MEDICINE CLINIC | Facility: CLINIC | Age: 74
End: 2019-05-30

## 2019-05-30 RX ORDER — COLCHICINE 0.6 MG/1
TABLET ORAL
Qty: 3 TABLET | Refills: 0 | Status: SHIPPED | OUTPATIENT
Start: 2019-05-30 | End: 2020-08-12

## 2019-05-30 NOTE — TELEPHONE ENCOUNTER
Patient states he is having a gout attack and would like medication to be sent to pharmacy. I tried to add him to the schedule but there are no openings within the next week for any of the doctors.  Patient would like medication sent to [facility] in [place].

## 2019-05-30 NOTE — TELEPHONE ENCOUNTER
Please tell pt I sent over colchicine. He should take asap and follow instructions. After that he can take tylenol or ibuprofen for pain. I can see him Monday at 1140 if he wants to come in.   thank you!

## 2019-05-31 NOTE — TELEPHONE ENCOUNTER
LVM for pt to c/b,  [address] sent med to RX & has opening to see him in office next week if needed.

## 2019-06-04 RX ORDER — COLCHICINE 0.6 MG/1
TABLET ORAL
Qty: 3 TABLET | Refills: 0 | OUTPATIENT
Start: 2019-06-04

## 2019-06-04 RX ORDER — NAPROXEN 500 MG/1
500 TABLET ORAL 2 TIMES DAILY PRN
Qty: 20 TABLET | Refills: 0 | Status: SHIPPED | OUTPATIENT
Start: 2019-06-04 | End: 2020-06-03

## 2019-06-04 NOTE — TELEPHONE ENCOUNTER
Please tell pt I will send in a different prescription, but if he is having gout pain, he needs to come see me and we also need to do blood work to check his kidneys. He can have a SDA or I can help get him in sometime next week or possibly this week.  thnx

## 2019-06-12 DIAGNOSIS — E03.9 HYPOTHYROIDISM, UNSPECIFIED TYPE: ICD-10-CM

## 2019-06-12 RX ORDER — LEVOTHYROXINE SODIUM 0.12 MG/1
125 TABLET ORAL
Qty: 30 TABLET | Refills: 0 | Status: SHIPPED | OUTPATIENT
Start: 2019-06-12 | End: 2019-07-15

## 2019-07-11 ENCOUNTER — OFFICE VISIT (OUTPATIENT)
Dept: INTERNAL MEDICINE CLINIC | Facility: CLINIC | Age: 74
End: 2019-07-11
Payer: MEDICARE

## 2019-07-11 VITALS
OXYGEN SATURATION: 99 % | WEIGHT: 142 LBS | HEIGHT: 65 IN | SYSTOLIC BLOOD PRESSURE: 126 MMHG | HEART RATE: 66 BPM | BODY MASS INDEX: 23.66 KG/M2 | DIASTOLIC BLOOD PRESSURE: 77 MMHG

## 2019-07-11 DIAGNOSIS — S39.92XA INJURY OF BACK, INITIAL ENCOUNTER: ICD-10-CM

## 2019-07-11 DIAGNOSIS — M54.50 ACUTE LEFT-SIDED LOW BACK PAIN WITHOUT SCIATICA: Primary | ICD-10-CM

## 2019-07-11 DIAGNOSIS — Z72.89 ALCOHOL USE: ICD-10-CM

## 2019-07-11 DIAGNOSIS — E03.9 HYPOTHYROIDISM, UNSPECIFIED TYPE: ICD-10-CM

## 2019-07-11 LAB
ALBUMIN SERPL-MCNC: 3.6 G/DL (ref 3.4–5)
ALBUMIN/GLOB SERPL: 1.1 {RATIO} (ref 1–2)
ALP LIVER SERPL-CCNC: 103 U/L (ref 45–117)
ALT SERPL-CCNC: 19 U/L (ref 16–61)
ANION GAP SERPL CALC-SCNC: 9 MMOL/L (ref 0–18)
AST SERPL-CCNC: 27 U/L (ref 15–37)
BILIRUB SERPL-MCNC: 0.3 MG/DL (ref 0.1–2)
BUN BLD-MCNC: 18 MG/DL (ref 7–18)
BUN/CREAT SERPL: 15.1 (ref 10–20)
CALCIUM BLD-MCNC: 8.8 MG/DL (ref 8.5–10.1)
CHLORIDE SERPL-SCNC: 109 MMOL/L (ref 98–112)
CO2 SERPL-SCNC: 22 MMOL/L (ref 21–32)
CREAT BLD-MCNC: 1.19 MG/DL (ref 0.7–1.3)
GLOBULIN PLAS-MCNC: 3.3 G/DL (ref 2.8–4.4)
GLUCOSE BLD-MCNC: 79 MG/DL (ref 70–99)
M PROTEIN MFR SERPL ELPH: 6.9 G/DL (ref 6.4–8.2)
OSMOLALITY SERPL CALC.SUM OF ELEC: 291 MOSM/KG (ref 275–295)
PATIENT FASTING: NO
POTASSIUM SERPL-SCNC: 3.9 MMOL/L (ref 3.5–5.1)
SODIUM SERPL-SCNC: 140 MMOL/L (ref 136–145)
T4 FREE SERPL-MCNC: 0.8 NG/DL (ref 0.8–1.7)
TSI SER-ACNC: 6.44 MIU/ML (ref 0.36–3.74)

## 2019-07-11 PROCEDURE — 84443 ASSAY THYROID STIM HORMONE: CPT | Performed by: FAMILY MEDICINE

## 2019-07-11 PROCEDURE — 36415 COLL VENOUS BLD VENIPUNCTURE: CPT | Performed by: FAMILY MEDICINE

## 2019-07-11 PROCEDURE — 99214 OFFICE O/P EST MOD 30 MIN: CPT | Performed by: FAMILY MEDICINE

## 2019-07-11 PROCEDURE — 80053 COMPREHEN METABOLIC PANEL: CPT | Performed by: FAMILY MEDICINE

## 2019-07-11 PROCEDURE — 84439 ASSAY OF FREE THYROXINE: CPT | Performed by: FAMILY MEDICINE

## 2019-07-11 RX ORDER — TRAMADOL HYDROCHLORIDE 50 MG/1
50 TABLET ORAL EVERY 6 HOURS PRN
Qty: 60 TABLET | Refills: 0 | Status: SHIPPED | OUTPATIENT
Start: 2019-07-11 | End: 2019-09-05

## 2019-07-11 NOTE — PROGRESS NOTES
Lea Munoz is a 66year old male. CC:  Patient presents with:  Low Back Pain: patient presents for low back pain/ ladder fell on back over a week ago. Refill Request      HPI:  1.  Acute on chronic back pain  States here for back pain, scaffold (50 mg total) by mouth 2 (two) times daily. Disp: 60 tablet Rfl: 5   LEVOTHYROXINE SODIUM 125 MCG Oral Tab TAKE 1 TABLET (125 MCG TOTAL) BY MOUTH BEFORE BREAKFAST.  Disp: 30 tablet Rfl: 0   colchicine 0.6 MG Oral Tab Take 2 tablets, and then one hour later swelling, full ROM. :  No urinary or genital complaints. MKSKL:  No ROM restrictions, no weakness, + back pain  NEURO:  Denies headaches, dizziness, peripheral numbness.         Vitals: /77 (BP Location: Right arm, Patient Position: Sitting, Cuff placed   - COMP METABOLIC PANEL (14); Future  -  NAVIGATOR  - COMP METABOLIC PANEL (14)    4. Injury of back, initial encounter    - XR LUMBAR SPINE (MIN 4 VIEWS) (CPT=72110); Future  - traMADol HCl 50 MG Oral Tab;  Take 1 tablet (50 mg total) by mouth ev

## 2019-07-15 DIAGNOSIS — E03.9 HYPOTHYROIDISM, UNSPECIFIED TYPE: ICD-10-CM

## 2019-07-16 ENCOUNTER — TELEPHONE (OUTPATIENT)
Dept: INTERNAL MEDICINE CLINIC | Facility: CLINIC | Age: 74
End: 2019-07-16

## 2019-07-16 RX ORDER — LEVOTHYROXINE SODIUM 0.12 MG/1
125 TABLET ORAL
Qty: 90 TABLET | Refills: 0 | Status: SHIPPED | OUTPATIENT
Start: 2019-07-16 | End: 2019-08-26

## 2019-07-16 NOTE — TELEPHONE ENCOUNTER
Medical records release form was sent to [facility] medical records department  Ph. [phone] fax [phone] and sent to [facility]  re fax medical release to [address] [place] [address] [phone]

## 2019-08-25 RX ORDER — TRAZODONE HYDROCHLORIDE 50 MG/1
TABLET ORAL
Qty: 90 TABLET | Refills: 1 | OUTPATIENT
Start: 2019-08-25

## 2019-08-25 NOTE — TELEPHONE ENCOUNTER
I don't think pt is still on this med/ I haven't filled this in 2 years.  If he is getting from psychiatrist, he needs to contact his psychiatrist.

## 2019-08-26 ENCOUNTER — TELEPHONE (OUTPATIENT)
Dept: INTERNAL MEDICINE CLINIC | Facility: CLINIC | Age: 74
End: 2019-08-26

## 2019-08-26 DIAGNOSIS — I10 ESSENTIAL HYPERTENSION: ICD-10-CM

## 2019-08-26 DIAGNOSIS — E03.9 HYPOTHYROIDISM, UNSPECIFIED TYPE: ICD-10-CM

## 2019-08-26 RX ORDER — METOPROLOL TARTRATE 50 MG/1
50 TABLET, FILM COATED ORAL 2 TIMES DAILY
Qty: 60 TABLET | Refills: 5 | Status: SHIPPED | OUTPATIENT
Start: 2019-08-26 | End: 2020-02-19

## 2019-08-26 RX ORDER — LEVOTHYROXINE SODIUM 0.12 MG/1
125 TABLET ORAL
Qty: 90 TABLET | Refills: 0 | Status: SHIPPED | OUTPATIENT
Start: 2019-08-26 | End: 2019-09-09 | Stop reason: ALTCHOICE

## 2019-09-01 DIAGNOSIS — S39.92XA INJURY OF BACK, INITIAL ENCOUNTER: ICD-10-CM

## 2019-09-05 ENCOUNTER — OFFICE VISIT (OUTPATIENT)
Dept: INTERNAL MEDICINE CLINIC | Facility: CLINIC | Age: 74
End: 2019-09-05
Payer: MEDICARE

## 2019-09-05 VITALS
WEIGHT: 135 LBS | OXYGEN SATURATION: 98 % | SYSTOLIC BLOOD PRESSURE: 128 MMHG | HEART RATE: 56 BPM | DIASTOLIC BLOOD PRESSURE: 78 MMHG | HEIGHT: 65 IN | BODY MASS INDEX: 22.49 KG/M2

## 2019-09-05 DIAGNOSIS — M54.6 ACUTE BILATERAL THORACIC BACK PAIN: ICD-10-CM

## 2019-09-05 DIAGNOSIS — M54.50 CHRONIC BILATERAL LOW BACK PAIN WITHOUT SCIATICA: ICD-10-CM

## 2019-09-05 DIAGNOSIS — E03.9 ACQUIRED HYPOTHYROIDISM: Primary | ICD-10-CM

## 2019-09-05 DIAGNOSIS — G89.29 CHRONIC BILATERAL LOW BACK PAIN WITHOUT SCIATICA: ICD-10-CM

## 2019-09-05 DIAGNOSIS — Z72.0 TOBACCO USE: ICD-10-CM

## 2019-09-05 DIAGNOSIS — F32.A DEPRESSION, UNSPECIFIED DEPRESSION TYPE: ICD-10-CM

## 2019-09-05 LAB — TSI SER-ACNC: 2.75 MIU/ML (ref 0.36–3.74)

## 2019-09-05 PROCEDURE — 36415 COLL VENOUS BLD VENIPUNCTURE: CPT | Performed by: FAMILY MEDICINE

## 2019-09-05 PROCEDURE — 84443 ASSAY THYROID STIM HORMONE: CPT | Performed by: FAMILY MEDICINE

## 2019-09-05 PROCEDURE — 99214 OFFICE O/P EST MOD 30 MIN: CPT | Performed by: FAMILY MEDICINE

## 2019-09-05 RX ORDER — TRAMADOL HYDROCHLORIDE 50 MG/1
50 TABLET ORAL EVERY 6 HOURS PRN
Qty: 60 TABLET | Refills: 3 | Status: SHIPPED | OUTPATIENT
Start: 2019-09-05 | End: 2020-03-24

## 2019-09-05 RX ORDER — TRAMADOL HYDROCHLORIDE 50 MG/1
TABLET ORAL
Qty: 28 TABLET | Refills: 2 | OUTPATIENT
Start: 2019-09-05

## 2019-09-05 NOTE — PROGRESS NOTES
Shirley Lambert is a 66year old male. CC:  Patient presents with: Follow - Up: patient presents for medication F/U  Refill Request: refill tramadol for back pain      HPI:  1.  F/u back pain  Here for tramadol refill  Takes aleve most days which  he breakfast. Disp: 30 tablet Rfl: 1   risperiDONE 0.5 MG Oral Tab Take 1 tablet (0.5 mg total) by mouth nightly. Disp: 30 tablet Rfl: 0   gabapentin 600 MG Oral Tab Take 600 mg by mouth 2 (two) times daily.  Disp:  Rfl: 0   Zolpidem Tartrate 10 MG Oral Tab TA pain  NEURO:  Denies headaches, dizziness, peripheral numbness.         Vitals: /78 (BP Location: Right arm, Patient Position: Sitting, Cuff Size: adult)   Pulse 56   Ht 65\"   Wt 135 lb   SpO2 98%   BMI 22.47 kg/m²       Physical Exam:  GEN:  Well de with psych       Greater than 25 minutes spent face-to -face with patient today. Of that time, greater than 50% was spent on medical counseling related to symptoms, making a diagnosis, or coordinating care.      There are no diagnoses linked to this encount

## 2019-09-09 RX ORDER — LEVOTHYROXINE SODIUM 137 UG/1
137 TABLET ORAL
COMMUNITY
End: 2020-08-11

## 2019-09-16 ENCOUNTER — HOSPITAL ENCOUNTER (EMERGENCY)
Facility: HOSPITAL | Age: 74
Discharge: LEFT BEFORE DISCHARGE | End: 2019-09-16
Payer: MEDICARE

## 2019-09-16 VITALS
BODY MASS INDEX: 22.5 KG/M2 | WEIGHT: 140 LBS | RESPIRATION RATE: 20 BRPM | OXYGEN SATURATION: 98 % | HEART RATE: 103 BPM | TEMPERATURE: 99 F | DIASTOLIC BLOOD PRESSURE: 92 MMHG | SYSTOLIC BLOOD PRESSURE: 158 MMHG | HEIGHT: 66 IN

## 2019-09-25 ENCOUNTER — TELEPHONE (OUTPATIENT)
Dept: FAMILY MEDICINE CLINIC | Facility: CLINIC | Age: 74
End: 2019-09-25

## 2019-09-25 NOTE — TELEPHONE ENCOUNTER
Left message to call the office to get correct insurance information. Seems patient has [employer] medicare. Need to get insurance information.

## 2019-10-10 RX ORDER — TRAZODONE HYDROCHLORIDE 50 MG/1
TABLET ORAL
Qty: 90 TABLET | Refills: 1 | OUTPATIENT
Start: 2019-10-10

## 2019-10-11 ENCOUNTER — TELEPHONE (OUTPATIENT)
Dept: INTERNAL MEDICINE CLINIC | Facility: CLINIC | Age: 74
End: 2019-10-11

## 2019-10-11 NOTE — TELEPHONE ENCOUNTER
LVM. Dr. Aubrie Herrera wants to see patient. Rn not certain if daughter wants to make the appointment for her Dad or if we should ask him directly. Dr. Aubrie Herrera approved an [facility] [phone] appointment for him.

## 2019-10-11 NOTE — TELEPHONE ENCOUNTER
I think he has been off this med  He is on [place], so I don't want to add another sleep aid  He should f/u with his psychiatrist for this

## 2019-10-11 NOTE — TELEPHONE ENCOUNTER
Daughter, Dena Heads,  very concerned and running out of steam with her father's care needs,    Recovering heroin addict. Was on Heroin since age 25 and stopped at 56. Daughter not surprised he didn't tell PCP. Should not have any opiods.     He is presently ta

## 2019-10-11 NOTE — TELEPHONE ENCOUNTER
LVM for patient to verify that he is still needed this medication, if he does want to continue he will need to f/u w/ psychiatrist.  I do believe I had f/u with him a few months ago on this and he had stopped, it may have been an auto refill from the pharmacy.     Yajaira Montoya

## 2019-10-23 DIAGNOSIS — E03.9 HYPOTHYROIDISM, UNSPECIFIED TYPE: ICD-10-CM

## 2019-10-23 RX ORDER — LEVOTHYROXINE SODIUM 0.12 MG/1
125 TABLET ORAL
Qty: 90 TABLET | Refills: 0 | Status: SHIPPED | OUTPATIENT
Start: 2019-10-23 | End: 2019-11-06

## 2019-11-05 NOTE — TELEPHONE ENCOUNTER
Refaxed Medical records request to [facility] Records Dept at Fax # [phone].  **2nd Attempt**

## 2019-11-06 DIAGNOSIS — E03.9 HYPOTHYROIDISM, UNSPECIFIED TYPE: ICD-10-CM

## 2019-11-06 RX ORDER — LEVOTHYROXINE SODIUM 0.12 MG/1
125 TABLET ORAL
Qty: 90 TABLET | Refills: 0 | Status: SHIPPED | OUTPATIENT
Start: 2019-11-06 | End: 2019-11-13

## 2019-11-13 DIAGNOSIS — E03.9 HYPOTHYROIDISM, UNSPECIFIED TYPE: ICD-10-CM

## 2019-11-13 RX ORDER — LEVOTHYROXINE SODIUM 0.12 MG/1
125 TABLET ORAL
Qty: 90 TABLET | Refills: 0 | Status: SHIPPED | OUTPATIENT
Start: 2019-11-13 | End: 2020-01-09

## 2019-12-13 ENCOUNTER — HOSPITAL ENCOUNTER (EMERGENCY)
Facility: HOSPITAL | Age: 74
Discharge: HOME | End: 2019-12-13
Payer: MEDICARE

## 2019-12-13 ENCOUNTER — APPOINTMENT (OUTPATIENT)
Dept: GENERAL RADIOLOGY | Facility: HOSPITAL | Age: 74
End: 2019-12-13
Payer: MEDICARE

## 2019-12-13 VITALS
HEART RATE: 92 BPM | BODY MASS INDEX: 23 KG/M2 | TEMPERATURE: 98 F | OXYGEN SATURATION: 96 % | DIASTOLIC BLOOD PRESSURE: 91 MMHG | WEIGHT: 140 LBS | SYSTOLIC BLOOD PRESSURE: 150 MMHG | RESPIRATION RATE: 16 BRPM

## 2019-12-13 DIAGNOSIS — S22.32XA CLOSED FRACTURE OF ONE RIB OF LEFT SIDE, INITIAL ENCOUNTER: Primary | ICD-10-CM

## 2019-12-13 PROCEDURE — 99283 EMERGENCY DEPT VISIT LOW MDM: CPT

## 2019-12-13 PROCEDURE — 71101 X-RAY EXAM UNILAT RIBS/CHEST: CPT

## 2019-12-13 NOTE — ED PROVIDER NOTES
Patient Seen in: [facility] Emergency Department    History   CC: rib pain  HPI: Viral Castanon 66year old male  who presents to the ER c/o left-sided anterio-lateral rib pain status post injury 3 days ago in which patient states he slipped g Take 137 mcg by mouth before breakfast.   traMADol HCl 50 MG Oral Tab,  Take 1 tablet (50 mg total) by mouth every 6 (six) hours as needed for Pain. Metoprolol Tartrate 50 MG Oral Tab,  Take 1 tablet (50 mg total) by mouth 2 (two) times daily.    naproxen palpation.   No producible pain on exam   - no CVA tenderness  Skin - no rashes or petechiae noted, pink warm and dry throughout, mmm, no obvious signs of swelling/trauma/deformity, cap refill <2seconds  Neuro - A&O x4, steady gait  MSK - makes purposeful Plan     Clinical Impression:  Closed fracture of one rib of left side, initial encounter  (primary encounter diagnosis)    Disposition:  Discharge    Follow-up:  Arnold Lindsey MD  [address] [id number] [phone]    Schedule an ap

## 2019-12-13 NOTE — ED INITIAL ASSESSMENT (HPI)
The patient reports that he slipped and fell coming out of the shower in his bathroom last night at 6 pm and he is now complaining of left lateral to anterior rib pain, denying any head injury, blood thinner use or loss of consciousness.

## 2019-12-24 NOTE — TELEPHONE ENCOUNTER
Refaxed Medical records request to [facility] Records Dept at Fax # [phone].  **3rd Attempt**

## 2020-01-09 DIAGNOSIS — E03.9 HYPOTHYROIDISM, UNSPECIFIED TYPE: ICD-10-CM

## 2020-01-09 RX ORDER — LEVOTHYROXINE SODIUM 0.12 MG/1
125 TABLET ORAL
Qty: 90 TABLET | Refills: 0 | Status: SHIPPED | OUTPATIENT
Start: 2020-01-09 | End: 2020-04-13

## 2020-02-19 DIAGNOSIS — I10 ESSENTIAL HYPERTENSION: ICD-10-CM

## 2020-02-19 RX ORDER — METOPROLOL TARTRATE 50 MG/1
TABLET, FILM COATED ORAL
Qty: 180 TABLET | Refills: 1 | Status: SHIPPED | OUTPATIENT
Start: 2020-02-19 | End: 2020-11-25 | Stop reason: ALTCHOICE

## 2020-03-23 DIAGNOSIS — M54.50 CHRONIC BILATERAL LOW BACK PAIN WITHOUT SCIATICA: ICD-10-CM

## 2020-03-23 DIAGNOSIS — G89.29 CHRONIC BILATERAL LOW BACK PAIN WITHOUT SCIATICA: ICD-10-CM

## 2020-03-23 DIAGNOSIS — M54.6 ACUTE BILATERAL THORACIC BACK PAIN: ICD-10-CM

## 2020-03-24 RX ORDER — TRAMADOL HYDROCHLORIDE 50 MG/1
TABLET ORAL
Qty: 60 TABLET | Refills: 0 | Status: SHIPPED | OUTPATIENT
Start: 2020-03-24 | End: 2020-08-11

## 2020-03-24 NOTE — TELEPHONE ENCOUNTER
Requested Prescriptions     Pending Prescriptions Disp Refills   • TRAMADOL HCL 50 MG Oral Tab [Pharmacy Med Name: TRAMADOL HCL 50 MG TABLET] 60 tablet 0     Sig: TAKE 1 TABLET BY MOUTH EVERY 6 HOURS AS NEEDED FOR PAIN     Last office visit: 9-5-19  [facility]

## 2020-04-13 DIAGNOSIS — E03.9 HYPOTHYROIDISM, UNSPECIFIED TYPE: ICD-10-CM

## 2020-04-13 RX ORDER — LEVOTHYROXINE SODIUM 0.12 MG/1
125 TABLET ORAL
Qty: 90 TABLET | Refills: 0 | Status: SHIPPED | OUTPATIENT
Start: 2020-04-13 | End: 2020-11-25

## 2020-08-11 ENCOUNTER — HOSPITAL ENCOUNTER (EMERGENCY)
Facility: HOSPITAL | Age: 75
Discharge: HOME | End: 2020-08-11
Attending: EMERGENCY MEDICINE
Payer: MEDICARE

## 2020-08-11 VITALS
HEART RATE: 86 BPM | TEMPERATURE: 99 F | DIASTOLIC BLOOD PRESSURE: 94 MMHG | HEIGHT: 64 IN | OXYGEN SATURATION: 97 % | SYSTOLIC BLOOD PRESSURE: 149 MMHG | WEIGHT: 140 LBS | BODY MASS INDEX: 23.9 KG/M2 | RESPIRATION RATE: 18 BRPM

## 2020-08-11 DIAGNOSIS — Z00.8 MEDICAL CLEARANCE FOR PSYCHIATRIC ADMISSION: Primary | ICD-10-CM

## 2020-08-11 LAB — SARS-COV-2 RNA RESP QL NAA+PROBE: NOT DETECTED

## 2020-08-11 PROCEDURE — 99283 EMERGENCY DEPT VISIT LOW MDM: CPT

## 2020-08-11 NOTE — ED PROVIDER NOTES
Patient Seen in: [facility] Emergency Department      History   Patient presents with:  Testing    Stated Complaint: Pt wanting to go to treatment facility but needs a COVID swab prior to getting *    HPI    Patient here for COVID19 testing prior to cephalic atraumatic. Nonicteric sclera. Moist mucous membranes. No meningismus. No adenopathy  Lungs: No tachypnea. Lungs clear to auscultation bilaterally without rales/rhonchi. Equal breath sounds bilaterally  Cardiac: No tachycardia. No murmurs.

## 2020-08-11 NOTE — ED INITIAL ASSESSMENT (HPI)
Patient arrives to ED for COVID swab before going to [facility] for alcohol detox.

## 2020-08-12 PROBLEM — F10.20 ALCOHOL USE DISORDER, SEVERE, DEPENDENCE (HCC): Status: ACTIVE | Noted: 2020-08-12

## 2020-09-10 ENCOUNTER — TELEPHONE (OUTPATIENT)
Dept: INTERNAL MEDICINE CLINIC | Facility: CLINIC | Age: 75
End: 2020-09-10

## 2020-09-10 DIAGNOSIS — H53.9 VISION CHANGES: ICD-10-CM

## 2020-09-10 DIAGNOSIS — Z11.1 SCREENING-PULMONARY TB: Primary | ICD-10-CM

## 2020-09-10 DIAGNOSIS — H53.69 DIMINISHED NIGHT VISION: ICD-10-CM

## 2020-09-10 DIAGNOSIS — Z20.1 TB (TUBERCULOSIS) CONTACT: ICD-10-CM

## 2020-09-10 NOTE — TELEPHONE ENCOUNTER
Pt's daughter states that the treatment center Pt is currently in is requesting a chest x-ray, can Dr Sharlot Bernheim order that? Please advise.

## 2020-09-11 NOTE — TELEPHONE ENCOUNTER
I am sorry  I don't know what treatment center he is in and what is going on  I dont want to order an xray unless I know what it is for    I havent seen him in over a year

## 2020-09-14 NOTE — TELEPHONE ENCOUNTER
At [facility]. 60 day program for alcohol abuse. Has been there for 3 weeks. Has been doing well thus far. Was exposed to TB and they need a CXR. Main number: [phone].  Call to verify if orders can be faxed to the facility or if famil

## 2020-09-26 ENCOUNTER — HOSPITAL ENCOUNTER (OUTPATIENT)
Dept: GENERAL RADIOLOGY | Age: 75
Discharge: HOME | End: 2020-09-26
Attending: FAMILY MEDICINE
Payer: MEDICARE

## 2020-09-26 ENCOUNTER — HOSPITAL ENCOUNTER (OUTPATIENT)
Age: 75
Discharge: HOME | End: 2020-09-26
Attending: EMERGENCY MEDICINE
Payer: MEDICARE

## 2020-09-26 VITALS
HEART RATE: 92 BPM | DIASTOLIC BLOOD PRESSURE: 93 MMHG | SYSTOLIC BLOOD PRESSURE: 171 MMHG | RESPIRATION RATE: 20 BRPM | OXYGEN SATURATION: 97 % | TEMPERATURE: 98 F

## 2020-09-26 DIAGNOSIS — K29.70 GASTRITIS, PRESENCE OF BLEEDING UNSPECIFIED, UNSPECIFIED CHRONICITY, UNSPECIFIED GASTRITIS TYPE: ICD-10-CM

## 2020-09-26 DIAGNOSIS — Z20.1 TB (TUBERCULOSIS) CONTACT: ICD-10-CM

## 2020-09-26 DIAGNOSIS — Z11.1 SCREENING-PULMONARY TB: ICD-10-CM

## 2020-09-26 DIAGNOSIS — R10.9 ABDOMINAL PAIN OF UNKNOWN ETIOLOGY: Primary | ICD-10-CM

## 2020-09-26 PROCEDURE — 82272 OCCULT BLD FECES 1-3 TESTS: CPT

## 2020-09-26 PROCEDURE — 99213 OFFICE O/P EST LOW 20 MIN: CPT

## 2020-09-26 PROCEDURE — 71046 X-RAY EXAM CHEST 2 VIEWS: CPT | Performed by: FAMILY MEDICINE

## 2020-09-26 RX ORDER — FAMOTIDINE 20 MG/1
20 TABLET ORAL 2 TIMES DAILY PRN
Qty: 30 TABLET | Refills: 0 | Status: SHIPPED | OUTPATIENT
Start: 2020-09-26 | End: 2020-10-26

## 2020-09-26 NOTE — ED PROVIDER NOTES
Patient Seen in: [facility]      History   Patient presents with:  Abdominal Pain    Stated Complaint: ABDOMINAL PAIN    HPI    This is a pleasant nontoxic-appearing 49-year-old male who presents to the immediate care stating he Resp 20   Temp 98 °F (36.7 °C)   Temp src Temporal   SpO2 97 %   O2 Device        Current:BP (!) 171/93   Pulse 92   Temp 98 °F (36.7 °C) (Temporal)   Resp 20   SpO2 97%         Physical Exam  General: This a pleasant nontoxic appearing patient in no rachana bleeding unspecified, unspecified chronicity, unspecified gastritis type    Disposition:  Discharge  9/26/2020  2:42 pm    Follow-up:  Miri Mcgill MD  [address]  [place] [id number] [phone]    Schedule an appointment as soon as esha

## 2020-09-28 NOTE — PROGRESS NOTES
Called pt's daughter Edwardo Coronado at [phone]/ phone rang then went to a busy signal/unable to leave msg/ will call again.     Payton Pittman

## 2020-10-30 ENCOUNTER — HOSPITAL ENCOUNTER (EMERGENCY)
Age: 75
Discharge: HOME | End: 2020-10-30
Attending: EMERGENCY MEDICINE

## 2020-10-30 VITALS
TEMPERATURE: 97.9 F | SYSTOLIC BLOOD PRESSURE: 161 MMHG | WEIGHT: 132.28 LBS | OXYGEN SATURATION: 98 % | HEART RATE: 83 BPM | HEIGHT: 64 IN | RESPIRATION RATE: 16 BRPM | DIASTOLIC BLOOD PRESSURE: 79 MMHG | BODY MASS INDEX: 22.58 KG/M2

## 2020-10-30 DIAGNOSIS — I10 ESSENTIAL HYPERTENSION: Primary | ICD-10-CM

## 2020-10-30 LAB
ANION GAP SERPL CALC-SCNC: 8 MMOL/L (ref 10–20)
ATRIAL RATE (BPM): 67
BASOPHILS # BLD: 0 K/MCL (ref 0–0.3)
BASOPHILS NFR BLD: 0 %
BUN SERPL-MCNC: 15 MG/DL (ref 6–20)
BUN/CREAT SERPL: 14 (ref 7–25)
CALCIUM SERPL-MCNC: 8.6 MG/DL (ref 8.4–10.2)
CHLORIDE SERPL-SCNC: 109 MMOL/L (ref 98–107)
CO2 SERPL-SCNC: 29 MMOL/L (ref 21–32)
CREAT SERPL-MCNC: 1.1 MG/DL (ref 0.67–1.17)
DIFFERENTIAL METHOD BLD: NORMAL
EOSINOPHIL # BLD: 0.2 K/MCL (ref 0.1–0.5)
EOSINOPHIL NFR BLD: 3 %
ERYTHROCYTE [DISTWIDTH] IN BLOOD: 11.4 % (ref 11–15)
GLUCOSE SERPL-MCNC: 101 MG/DL (ref 65–99)
HCT VFR BLD CALC: 42.3 % (ref 39–51)
HGB BLD-MCNC: 14.1 G/DL (ref 13–17)
IMM GRANULOCYTES # BLD AUTO: 0 K/MCL (ref 0–0.2)
IMM GRANULOCYTES NFR BLD: 0 %
LYMPHOCYTES # BLD: 1.7 K/MCL (ref 1–4)
LYMPHOCYTES NFR BLD: 32 %
MAGNESIUM SERPL-MCNC: 2.2 MG/DL (ref 1.7–2.4)
MCH RBC QN AUTO: 28.8 PG (ref 26–34)
MCHC RBC AUTO-ENTMCNC: 33.3 G/DL (ref 32–36.5)
MCV RBC AUTO: 86.5 FL (ref 78–100)
MONOCYTES # BLD: 0.5 K/MCL (ref 0.3–0.9)
MONOCYTES NFR BLD: 9 %
NEUTROPHILS # BLD: 3 K/MCL (ref 1.8–7.7)
NEUTROPHILS NFR BLD: 56 %
NRBC BLD MANUAL-RTO: 0 /100 WBC
P AXIS (DEGREES): 27
PLATELET # BLD: 187 K/MCL (ref 140–450)
POTASSIUM SERPL-SCNC: 3.5 MMOL/L (ref 3.4–5.1)
PR-INTERVAL (MSEC): 172
QRS-INTERVAL (MSEC): 82
QT-INTERVAL (MSEC): 430
QTC: 454
R AXIS (DEGREES): 67
RBC # BLD: 4.89 MIL/MCL (ref 4.5–5.9)
REPORT TEXT: NORMAL
SODIUM SERPL-SCNC: 143 MMOL/L (ref 135–145)
T AXIS (DEGREES): 72
TROPONIN I SERPL HS-MCNC: <0.02 NG/ML
VENTRICULAR RATE EKG/MIN (BPM): 67
WBC # BLD: 5.4 K/MCL (ref 4.2–11)

## 2020-10-30 PROCEDURE — 36415 COLL VENOUS BLD VENIPUNCTURE: CPT

## 2020-10-30 PROCEDURE — 84484 ASSAY OF TROPONIN QUANT: CPT

## 2020-10-30 PROCEDURE — 83735 ASSAY OF MAGNESIUM: CPT

## 2020-10-30 PROCEDURE — 85025 COMPLETE CBC W/AUTO DIFF WBC: CPT

## 2020-10-30 PROCEDURE — 10004651 HB RX, NO CHARGE ITEM: Performed by: PHYSICIAN ASSISTANT

## 2020-10-30 PROCEDURE — 80048 BASIC METABOLIC PNL TOTAL CA: CPT

## 2020-10-30 PROCEDURE — 99283 EMERGENCY DEPT VISIT LOW MDM: CPT

## 2020-10-30 PROCEDURE — 93005 ELECTROCARDIOGRAM TRACING: CPT | Performed by: PHYSICIAN ASSISTANT

## 2020-10-30 RX ORDER — ACETAMINOPHEN 500 MG
1000 TABLET ORAL ONCE
Status: COMPLETED | OUTPATIENT
Start: 2020-10-30 | End: 2020-10-30

## 2020-10-30 RX ORDER — AMLODIPINE BESYLATE 10 MG/1
10 TABLET ORAL DAILY
Qty: 30 TABLET | Refills: 0 | Status: SHIPPED | OUTPATIENT
Start: 2020-10-30 | End: 2020-11-29

## 2020-10-30 RX ADMIN — ACETAMINOPHEN 1000 MG: 500 TABLET ORAL at 14:50

## 2020-10-30 ASSESSMENT — ENCOUNTER SYMPTOMS
BACK PAIN: 0
ABDOMINAL PAIN: 0
HEADACHES: 1
FEVER: 0
SHORTNESS OF BREATH: 0

## 2020-10-30 ASSESSMENT — PAIN SCALES - GENERAL: PAINLEVEL_OUTOF10: 4

## 2020-10-30 ASSESSMENT — PAIN DESCRIPTION - PAIN TYPE: TYPE: ACUTE PAIN

## 2020-11-25 ENCOUNTER — OFFICE VISIT (OUTPATIENT)
Dept: INTERNAL MEDICINE CLINIC | Facility: CLINIC | Age: 75
End: 2020-11-25
Payer: MEDICARE

## 2020-11-25 VITALS
RESPIRATION RATE: 15 BRPM | BODY MASS INDEX: 22.71 KG/M2 | SYSTOLIC BLOOD PRESSURE: 124 MMHG | WEIGHT: 133 LBS | OXYGEN SATURATION: 99 % | DIASTOLIC BLOOD PRESSURE: 74 MMHG | HEART RATE: 91 BPM | HEIGHT: 64 IN

## 2020-11-25 DIAGNOSIS — I10 ESSENTIAL HYPERTENSION: ICD-10-CM

## 2020-11-25 DIAGNOSIS — M54.50 CHRONIC BILATERAL LOW BACK PAIN WITHOUT SCIATICA: ICD-10-CM

## 2020-11-25 DIAGNOSIS — J30.2 SEASONAL ALLERGIC RHINITIS, UNSPECIFIED TRIGGER: ICD-10-CM

## 2020-11-25 DIAGNOSIS — F41.9 ANXIETY: ICD-10-CM

## 2020-11-25 DIAGNOSIS — G89.29 CHRONIC BILATERAL LOW BACK PAIN WITHOUT SCIATICA: ICD-10-CM

## 2020-11-25 DIAGNOSIS — E03.9 ACQUIRED HYPOTHYROIDISM: ICD-10-CM

## 2020-11-25 DIAGNOSIS — K21.9 GASTROESOPHAGEAL REFLUX DISEASE, UNSPECIFIED WHETHER ESOPHAGITIS PRESENT: ICD-10-CM

## 2020-11-25 DIAGNOSIS — R91.1 LUNG NODULE, SOLITARY: ICD-10-CM

## 2020-11-25 DIAGNOSIS — F10.11 HISTORY OF ALCOHOL ABUSE: ICD-10-CM

## 2020-11-25 DIAGNOSIS — H52.4 PRESBYOPIA: ICD-10-CM

## 2020-11-25 DIAGNOSIS — G47.00 INSOMNIA, UNSPECIFIED TYPE: ICD-10-CM

## 2020-11-25 DIAGNOSIS — H25.12 AGE-RELATED NUCLEAR CATARACT OF LEFT EYE: ICD-10-CM

## 2020-11-25 DIAGNOSIS — N18.31 CHRONIC KIDNEY DISEASE (CKD) STAGE G3A/A1, MODERATELY DECREASED GLOMERULAR FILTRATION RATE (GFR) BETWEEN 45-59 ML/MIN/1.73 SQUARE METER AND ALBUMINURIA CREATININE RATIO LESS THAN 30 MG/G (HCC): ICD-10-CM

## 2020-11-25 DIAGNOSIS — Z00.00 ADULT GENERAL MEDICAL EXAMINATION: Primary | ICD-10-CM

## 2020-11-25 DIAGNOSIS — K44.9 HIATAL HERNIA: ICD-10-CM

## 2020-11-25 PROBLEM — F10.20 ALCOHOL USE DISORDER, SEVERE, DEPENDENCE (HCC): Status: RESOLVED | Noted: 2020-08-12 | Resolved: 2020-11-25

## 2020-11-25 PROBLEM — H02.889 MEIBOMIAN GLAND DYSFUNCTION (MGD): Status: RESOLVED | Noted: 2019-01-24 | Resolved: 2020-11-25

## 2020-11-25 PROCEDURE — 82746 ASSAY OF FOLIC ACID SERUM: CPT | Performed by: NURSE PRACTITIONER

## 2020-11-25 PROCEDURE — 99213 OFFICE O/P EST LOW 20 MIN: CPT | Performed by: NURSE PRACTITIONER

## 2020-11-25 PROCEDURE — G0439 PPPS, SUBSEQ VISIT: HCPCS | Performed by: NURSE PRACTITIONER

## 2020-11-25 PROCEDURE — 84439 ASSAY OF FREE THYROXINE: CPT | Performed by: NURSE PRACTITIONER

## 2020-11-25 PROCEDURE — 82607 VITAMIN B-12: CPT | Performed by: NURSE PRACTITIONER

## 2020-11-25 PROCEDURE — 80053 COMPREHEN METABOLIC PANEL: CPT | Performed by: NURSE PRACTITIONER

## 2020-11-25 PROCEDURE — 84443 ASSAY THYROID STIM HORMONE: CPT | Performed by: NURSE PRACTITIONER

## 2020-11-25 RX ORDER — MELATONIN
100 DAILY
Qty: 90 TABLET | Refills: 0 | Status: SHIPPED | OUTPATIENT
Start: 2020-11-25

## 2020-11-25 RX ORDER — FAMOTIDINE 20 MG/1
20 TABLET ORAL 2 TIMES DAILY
Qty: 180 TABLET | Refills: 0 | Status: SHIPPED | OUTPATIENT
Start: 2020-11-25

## 2020-11-25 RX ORDER — FAMOTIDINE 20 MG/1
20 TABLET ORAL 2 TIMES DAILY
COMMUNITY
End: 2020-11-25

## 2020-11-25 RX ORDER — PANTOPRAZOLE SODIUM 40 MG/1
40 TABLET, DELAYED RELEASE ORAL
Qty: 90 TABLET | Refills: 0 | Status: SHIPPED | OUTPATIENT
Start: 2020-11-25

## 2020-11-25 RX ORDER — ZOLPIDEM TARTRATE 10 MG/1
10 TABLET ORAL NIGHTLY
Qty: 30 TABLET | Refills: 2 | Status: SHIPPED | OUTPATIENT
Start: 2020-11-25

## 2020-11-25 RX ORDER — ESCITALOPRAM OXALATE 5 MG/1
5 TABLET ORAL DAILY
Qty: 30 TABLET | Refills: 1 | Status: SHIPPED | OUTPATIENT
Start: 2020-11-25 | End: 2020-12-17

## 2020-11-25 RX ORDER — MELATONIN
100 DAILY
COMMUNITY
End: 2020-11-25

## 2020-11-25 RX ORDER — FOLIC ACID 1 MG/1
TABLET ORAL DAILY
COMMUNITY
End: 2020-11-25

## 2020-11-25 RX ORDER — LEVOTHYROXINE SODIUM 0.12 MG/1
125 TABLET ORAL
Qty: 90 TABLET | Refills: 0 | Status: SHIPPED | OUTPATIENT
Start: 2020-11-25 | End: 2021-03-01

## 2020-11-25 RX ORDER — PANTOPRAZOLE SODIUM 40 MG/1
40 TABLET, DELAYED RELEASE ORAL
COMMUNITY
End: 2020-11-25

## 2020-11-25 RX ORDER — AMLODIPINE BESYLATE 5 MG/1
5 TABLET ORAL DAILY
Qty: 90 TABLET | Refills: 0 | Status: SHIPPED | OUTPATIENT
Start: 2020-11-25 | End: 2021-02-15

## 2020-11-25 RX ORDER — AMLODIPINE BESYLATE 5 MG/1
5 TABLET ORAL DAILY
COMMUNITY
End: 2020-11-25

## 2020-11-25 RX ORDER — FOLIC ACID 1 MG/1
1 TABLET ORAL DAILY
Qty: 90 TABLET | Refills: 0 | Status: SHIPPED | OUTPATIENT
Start: 2020-11-25 | End: 2021-02-15

## 2020-11-25 NOTE — PROGRESS NOTES
"Subjective:      Patient ID: Gavin Zamora HPI:   Jayde Chávez is a 66year old male who presents for a Medicare Subsequent Annual Wellness visit (Pt already had Initial Annual Wellness). Patient is 90-days sober from alcohol, was in-patient rehab and is now at home.  He also quit smoking a and oriented to person, place, and time.   Skin: Skin is warm, dry and intact. No rash noted.   Psychiatric: He has a normal mood and affect. His speech is normal and behavior is normal. Judgment and thought content normal. Cognition and memory are normal.   Vitals reviewed.     Assessment:      1. Epigastric pain       Plan:   1. Epigastric pain  Take protonix daily and can add Tums or Milk of Magnesia for relief of pain.    - pantoprazole (PROTONIX) 20 MG tablet; Take 1 tablet (20 mg total) by mouth once daily.  Dispense: 30 tablet; Refill: 11    2. Essential hypertension  Controlled with current medication.    Follow up in 2 weeks with me.  Patient agreed with plan and expressed understanding.     no additional follow-up recommended at that time.      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 He  has a past surgical history that includes Cataract extraction (Right).     His family history includ Acuity: Uncorrected Right Eye Chart Acuity: 20/40   Left Eye Visual Acuity: Uncorrected Left Eye Chart Acuity: 20/40         Able To Tolerate Visual Acuity: Yes      General Appearance:  Alert, cooperative, no distress, appears stated age   Head:  Normocep old male who presents for a Medicare Assessment.      PLAN SUMMARY:   Diagnoses and all orders for this visit:    Adult general medical examination  -Recommended healthy diet and regular exercise  -Annual eye exam, biannual dental visits    Acquired hypothy INTERNAL    History of alcohol abuse  -     VITAMIN B12; Future  -     FOLIC ACID SERUM(FOLATE); Future  -     GASTRO - INTERNAL  -     BH NAVIGATOR  -     folic acid 1 MG Oral Tab; Take 1 tablet (1 mg total) by mouth daily.   -     Thiamine HCl 100 MG Oral HbgA1C   Annually GLYCOHEMOGLOBIN (HgA1c) (L) (%)   Date Value   08/25/2016 5.9       No flowsheet data found.     Fasting Blood Sugar (FSB)Annually Glucose (mg/dL)   Date Value   08/13/2020 91       Cardiovascular Disease Screening     LDL Annually LDL Cho benefits, but Medicare does not cover unless Medically needed    Zoster   Not covered by Medicare Part B No vaccine history found This may be covered with your pharmacy  prescription benefits

## 2020-11-25 NOTE — PATIENT INSTRUCTIONS
Lifestyle Changes for Controlling GERD  When you have GERD, stomach acid feels as if it’s backing up toward your mouth. Making lifestyle changes can often improve your symptoms. This is true if you take medicine to control your GERD or not.  Talk with you © 0253-0710 [employer] [address] [facility], [address] [place]. All rights reserved. This information is not intended as a substitute for professional medical care. Always follow your healthcare professional's instructions.         Prevent · CT colonography (virtual colonoscopy) every 5 years, or  · Yearly fecal occult blood test, or  · Stool DNA test every 3 years  If you choose a test other than a colonoscopy and have an abnormal test result, you will need to have a colonoscopy.  Screening Tuberculosis Men at increased risk for infection Talk with your healthcare provider   Vision All men in this age group Every 1 to 2 years; if you have a chronic health condition, ask your healthcare provider if you need exams more often   Vaccine Who needs · Zoster live vaccine (ZVL; Zostavax) may be given to healthy adults over age 61. It's given in one dose.    Counseling Who needs it How often   Diet and exercise Men who are overweight or obese When diagnosed, and then at routine exams   Fall prevention (e

## 2020-11-30 DIAGNOSIS — E53.8 VITAMIN B12 DEFICIENCY: Primary | ICD-10-CM

## 2020-11-30 DIAGNOSIS — N18.31 CHRONIC KIDNEY DISEASE (CKD) STAGE G3A/A1, MODERATELY DECREASED GLOMERULAR FILTRATION RATE (GFR) BETWEEN 45-59 ML/MIN/1.73 SQUARE METER AND ALBUMINURIA CREATININE RATIO LESS THAN 30 MG/G (HCC): ICD-10-CM

## 2020-11-30 DIAGNOSIS — E03.9 ACQUIRED HYPOTHYROIDISM: ICD-10-CM

## 2020-12-01 ENCOUNTER — TELEPHONE (OUTPATIENT)
Dept: INTERNAL MEDICINE CLINIC | Facility: CLINIC | Age: 75
End: 2020-12-01

## 2020-12-01 NOTE — TELEPHONE ENCOUNTER
----- Message from JOSIANE Rodriguez sent at 11/30/2020 12:16 PM CST -----  Results reviewed. Please inform patient as below:    Efren Peraza,  Your thyroid labs are within normal limits. I recommend we repeat this test in 3 months.  Your kidney funct

## 2020-12-01 NOTE — TELEPHONE ENCOUNTER
Spoke w/ pt, discussed lab results, he understands & will p/ B12 from pharmacy today, will follow up with labs in 3 mos, has no questions at this time.      Osvaldo Dickson

## 2020-12-09 NOTE — TELEPHONE ENCOUNTER
Patient's daughter called. Her father needs a refill on Hydroxyzine Pamoate 50 mg oral cap medication. The daughter said that Dr. Teresa Sanders didn't originally fill the prescription but it is marked in his chart.     The doctor that prescribed the medication h

## 2020-12-17 ENCOUNTER — OFFICE VISIT (OUTPATIENT)
Dept: INTERNAL MEDICINE CLINIC | Facility: CLINIC | Age: 75
End: 2020-12-17
Payer: MEDICARE

## 2020-12-17 VITALS
HEART RATE: 71 BPM | SYSTOLIC BLOOD PRESSURE: 160 MMHG | WEIGHT: 138 LBS | DIASTOLIC BLOOD PRESSURE: 80 MMHG | BODY MASS INDEX: 23.56 KG/M2 | OXYGEN SATURATION: 97 % | HEIGHT: 64 IN

## 2020-12-17 DIAGNOSIS — R39.15 URINARY URGENCY: ICD-10-CM

## 2020-12-17 DIAGNOSIS — N32.81 OVERACTIVE BLADDER: Primary | ICD-10-CM

## 2020-12-17 DIAGNOSIS — I10 ESSENTIAL HYPERTENSION: ICD-10-CM

## 2020-12-17 DIAGNOSIS — G47.00 INSOMNIA, UNSPECIFIED TYPE: ICD-10-CM

## 2020-12-17 DIAGNOSIS — Z12.5 SCREENING FOR PROSTATE CANCER: ICD-10-CM

## 2020-12-17 DIAGNOSIS — F41.9 ANXIETY: ICD-10-CM

## 2020-12-17 PROCEDURE — 36415 COLL VENOUS BLD VENIPUNCTURE: CPT | Performed by: FAMILY MEDICINE

## 2020-12-17 PROCEDURE — 87086 URINE CULTURE/COLONY COUNT: CPT | Performed by: FAMILY MEDICINE

## 2020-12-17 PROCEDURE — 81001 URINALYSIS AUTO W/SCOPE: CPT | Performed by: FAMILY MEDICINE

## 2020-12-17 PROCEDURE — 99214 OFFICE O/P EST MOD 30 MIN: CPT | Performed by: FAMILY MEDICINE

## 2020-12-17 RX ORDER — AMLODIPINE BESYLATE 10 MG/1
10 TABLET ORAL DAILY
Qty: 30 TABLET | Refills: 1 | Status: SHIPPED | OUTPATIENT
Start: 2020-12-17 | End: 2021-02-08

## 2020-12-17 RX ORDER — ESCITALOPRAM OXALATE 10 MG/1
10 TABLET ORAL DAILY
Qty: 30 TABLET | Refills: 1 | Status: SHIPPED | OUTPATIENT
Start: 2020-12-17 | End: 2021-02-08

## 2020-12-17 RX ORDER — TAMSULOSIN HYDROCHLORIDE 0.4 MG/1
0.4 CAPSULE ORAL DAILY
Qty: 30 CAPSULE | Refills: 1 | Status: SHIPPED | OUTPATIENT
Start: 2020-12-17 | End: 2021-02-08

## 2020-12-17 NOTE — PROGRESS NOTES
Mayola Bernheim is a 66year old male.     CC:  Patient presents with:  Medication Follow-Up: Pt presents for medication F/U  Refill Request      HPI:      Pt with hx HL, HTN, CKD,and anxiety  Not seen by myself in > 1 year  During that time was in rehab mouth daily.  90 tablet 0   • Levothyroxine Sodium 125 MCG Oral Tab Take 1 tablet (125 mcg total) by mouth before breakfast. 90 tablet 0   • Pantoprazole Sodium 40 MG Oral Tab EC Take 1 tablet (40 mg total) by mouth every morning before breakfast. 90 tablet Opiods       ROS:  GENERAL:  No weight change, no fever, no chills, no change in energy level. HEENT:  Denies all symptoms.   NECK:   no masses, no rigidity  HEART:  No chest pain, no exertional shortness of breath  LUNGS:  No cough, no wheezing, no shortn daily.  Dispense: 30 capsule; Refill: 1  - PSA SCREEN    4. Screening for prostate cancer    - PSA SCREEN; Future  - PSA SCREEN    5. Urinary urgency    - tamsulosin (FLOMAX) cap; Take 1 capsule (0.4 mg total) by mouth daily. Dispense: 30 capsule;  Refill:

## 2020-12-17 NOTE — PATIENT INSTRUCTIONS
Increase amlodipine to 10    Increase lexapro to 10    Please watch for RN to call to help get you connected with psychiatrist     DO NOT [address] [place] [address]    Once get into psychiatrist, they will need to take over ambien refills     jefferson

## 2021-01-14 ENCOUNTER — TELEPHONE (OUTPATIENT)
Dept: INTERNAL MEDICINE CLINIC | Facility: CLINIC | Age: 76
End: 2021-01-14

## 2021-01-14 DIAGNOSIS — R35.0 URINARY FREQUENCY: Primary | ICD-10-CM

## 2021-01-14 NOTE — TELEPHONE ENCOUNTER
1. Urine:  I checked last visit- normal. I would try flomax if he hasnt. If worried it is UTI go to walk in. I did place referral to urology Dr Valdivia Living [phone]    2. Psych- I increased lexapro from 5 to 10 mg a month ago. If mood worse go back to 5 mg.  I

## 2021-01-14 NOTE — TELEPHONE ENCOUNTER
Informed daughter that PCP out until Monday. Patient is urinating every 1/2 hour or so for the past few weeks. Asked her to ask patient if there is anything different about his urine: color, smell clarity just to r/o UTI.   Daughter not sure he is actuall

## 2021-01-14 NOTE — TELEPHONE ENCOUNTER
Patient's daughter called as her father saw Dr. Brenna Justin last month and he talked to the doctor about needing a referral to see a urologist as soon as possible as he is having a lot of problems.   One she said if he is in the car and needs to pee he has to get

## 2021-02-01 ENCOUNTER — TELEPHONE (OUTPATIENT)
Dept: INTERNAL MEDICINE CLINIC | Facility: CLINIC | Age: 76
End: 2021-02-01

## 2021-02-02 DIAGNOSIS — F41.9 ANXIETY: ICD-10-CM

## 2021-02-02 RX ORDER — HYDROXYZINE PAMOATE 50 MG/1
CAPSULE ORAL
Qty: 60 CAPSULE | Refills: 2 | OUTPATIENT
Start: 2021-02-02

## 2021-02-02 NOTE — TELEPHONE ENCOUNTER
Pt was informed and states that has an upcoming appt with his psychiatrist on 2/11/21.     Brigette Beaver

## 2021-02-02 NOTE — TELEPHONE ENCOUNTER
Please tell pt he needs to see a psychiatrist to manage his sleep and anxiety meds. I did not refill. He was seeing psych recently. I don't want to prescribe when I don't know exactly what is going on and feel better if psychiatry managing his case. He can also follow up with me but needs to see psychiatry. I refilled this once while he was getting into see psych.

## 2021-02-07 DIAGNOSIS — R39.15 URINARY URGENCY: ICD-10-CM

## 2021-02-07 DIAGNOSIS — I10 ESSENTIAL HYPERTENSION: ICD-10-CM

## 2021-02-07 DIAGNOSIS — N32.81 OVERACTIVE BLADDER: ICD-10-CM

## 2021-02-07 DIAGNOSIS — F41.9 ANXIETY: ICD-10-CM

## 2021-02-08 RX ORDER — ESCITALOPRAM OXALATE 10 MG/1
TABLET ORAL
Qty: 30 TABLET | Refills: 1 | Status: SHIPPED | OUTPATIENT
Start: 2021-02-08

## 2021-02-08 RX ORDER — TAMSULOSIN HYDROCHLORIDE 0.4 MG/1
CAPSULE ORAL
Qty: 30 CAPSULE | Refills: 1 | Status: SHIPPED | OUTPATIENT
Start: 2021-02-08

## 2021-02-08 RX ORDER — AMLODIPINE BESYLATE 10 MG/1
TABLET ORAL
Qty: 30 TABLET | Refills: 1 | Status: SHIPPED | OUTPATIENT
Start: 2021-02-08

## 2021-02-10 ENCOUNTER — TELEPHONE (OUTPATIENT)
Dept: INTERNAL MEDICINE CLINIC | Facility: CLINIC | Age: 76
End: 2021-02-10

## 2021-02-10 NOTE — TELEPHONE ENCOUNTER
Received faxed request for Medical Records from  [facility]  [phone]  Fax [phone]  Sent to [employer]

## 2021-02-14 DIAGNOSIS — F10.11 HISTORY OF ALCOHOL ABUSE: ICD-10-CM

## 2021-02-14 DIAGNOSIS — I10 ESSENTIAL HYPERTENSION: ICD-10-CM

## 2021-02-15 RX ORDER — FOLIC ACID 1 MG/1
TABLET ORAL
Qty: 90 TABLET | Refills: 0 | Status: SHIPPED | OUTPATIENT
Start: 2021-02-15

## 2021-02-15 RX ORDER — AMLODIPINE BESYLATE 5 MG/1
TABLET ORAL
Qty: 90 TABLET | Refills: 0 | Status: SHIPPED | OUTPATIENT
Start: 2021-02-15

## 2021-02-22 NOTE — TELEPHONE ENCOUNTER
Second request received from [facility]. Phone# [phone] and TFV#[phone]. Second request sent to  [employer] in green bag.

## 2021-03-01 DIAGNOSIS — E03.9 ACQUIRED HYPOTHYROIDISM: ICD-10-CM

## 2021-03-01 RX ORDER — LEVOTHYROXINE SODIUM 0.12 MG/1
125 TABLET ORAL
Qty: 90 TABLET | Refills: 0 | Status: SHIPPED | OUTPATIENT
Start: 2021-03-01

## 2021-03-12 DIAGNOSIS — Z23 NEED FOR VACCINATION: ICD-10-CM

## 2021-03-25 ENCOUNTER — TELEPHONE (OUTPATIENT)
Dept: FAMILY MEDICINE CLINIC | Facility: CLINIC | Age: 76
End: 2021-03-25

## 2021-03-25 NOTE — TELEPHONE ENCOUNTER
Received fax from pharm saying Folic acid is not covered under pt insurance plan abymore.  Requesting change

## 2021-06-14 NOTE — PROGRESS NOTES
Quick Note:    D/w pt. Denies fatigue. Has never had colonoscopy- asked pt again to please sched this ( has referral). He says he will now.  Recheck cbc 1-2 mos  ______ janice

## 2021-06-22 DIAGNOSIS — F10.11 HISTORY OF ALCOHOL ABUSE: ICD-10-CM

## 2021-06-22 RX ORDER — FOLIC ACID 1 MG/1
TABLET ORAL
Qty: 90 TABLET | Refills: 0 | OUTPATIENT
Start: 2021-06-22

## 2021-06-22 NOTE — TELEPHONE ENCOUNTER
A refill request was received for:  Requested Prescriptions     Pending Prescriptions Disp Refills   • FOLIC ACID 1 MG Oral Tab [Pharmacy Med Name: FOLIC ACID 1MG TABLETS] 90 tablet 0     Sig: TAKE 1 TABLET BY MOUTH EVERY DAY     Not covered under pt plan.

## 2022-03-02 ENCOUNTER — TELEPHONE (OUTPATIENT)
Dept: INTERNAL MEDICINE CLINIC | Facility: CLINIC | Age: 77
End: 2022-03-02

## 2024-09-19 NOTE — PROGRESS NOTES
HPI:    Patient ID: Ba Trivedi is a 74year old male. HPI  This 49-year-old male presents 1 week post total nail removal right hallux. Patient states he is doing well and has no pain no present concerns.   Review of Systems         Current Outpa
No

## 2025-02-24 NOTE — ED INITIAL ASSESSMENT (HPI)
c/o headache x1 week, denies nausea You can access the FollowMyHealth Patient Portal offered by [facility] by registering at the following website: [email or web address]. By joining [facility]’s FollowMyHealth portal, you will also be able to view your health information using other applications (apps) compatible with our system.

## (undated) NOTE — LETTER
ARNULFO Notifier: [employer]   JAYY. Patient Name: Elizabeth Licona. Identification Number: [id number]      Advance Beneficiary Notice of Noncoverage (ABN)  NOTE:  If Medicare doesn’t pay for D. Left lipoma corticosteroid injection (dercum's disease directions on the MSN. If Medicare does pay, you will refund any payments I made to you, less co-pays or deductibles. ? OPTION 2. I want the D. Left lipoma corticosteroid injection (dercum's disease listed above, but do not bill Medicare.  You may ask to

## (undated) NOTE — ED AVS SNAPSHOT
Bill Garcia   MRN: [id number]    Department:  [facility] Emergency Department   Date of Visit:  12/13/2019           Disclosure     Insurance plans vary and the physician(s) referred by the ER may not be covered by your plan.  Please cont within the next three months to obtain basic health screening including reassessment of your blood pressure.     IF THERE IS ANY CHANGE OR WORSENING OF YOUR CONDITION, CALL YOUR PRIMARY CARE PHYSICIAN AT ONCE OR RETURN IMMEDIATELY TO THE EMERGENCY DEPARTMEN

## (undated) NOTE — MR AVS SNAPSHOT
[address] [facility] [address]  [phone]               Thank you for choosing us for your health care visit with Marybeth Kearns MD.  We are glad to serve you and happy to provide you with Chintan Diaz INTERNAL [[id number] CUSTOM]  Order #:  [id number]         **REFERRAL REQUEST**    Your physician has referred you to a specialist.  Your physician or the clinic staff will provide you with the phone number you should call to schedule your appointme TAKE 1 TABLET BY MOUTH BEFORE BREAKFAST. Commonly known as:  SYNTHROID, LEVOTHROID           lisinopril 20 MG Tabs   Take 1 tablet (20 mg total) by mouth daily.    Commonly known as:  PRINIVIL,ZESTRIL           LORazepam 2 MG Tabs   Take 1 tablet (2 mg to

## (undated) NOTE — MR AVS SNAPSHOT
[address]  [address]  [phone]               Thank you for choosing us for your health care visit with Katherine Pozo DPM.  We are glad to serve you and happy to provide yo procedure. IF A CHILD is scheduled for this procedure, parents should be advised that they will not be able to accompany the child in the testing room.  Parents will remain in the MRI waiting area and be reunited with the child upon completion of the MR Sertraline HCl 50 MG Tabs   Take 1 tablet (50 mg total) by mouth daily.    Commonly known as:  ZOLOFT                   MyChart                  Visit [facility] online at  [email or web address]

## (undated) NOTE — MR AVS SNAPSHOT
[address] [id number] [phone]               Thank you for choosing us for your health care visit with Myles Graves MD.  We are glad to serve you and happy to provide you with may be held responsible for payment in full if proper authorization is not acquired. Please contact the Patient Business Office at [phone] if you have any questions related to insurance coverage.          Referral Details     Referred By    Johnna T Treatment: Evaluate & Treat  Physician goals: Pain relief  Region Specific: Shoulder  Rehab potential: Good  Frequency: 2 times per week  Duration: 8 weeks  Number of visits: 12    Assoc Dx:  Biceps strain, left, initial encounter [S46.112A], Acute pain o Take 1 tablet (2 mg total) by mouth 2 (two) times daily as needed for Anxiety. Commonly known as:  ATIVAN           Metoprolol Succinate ER 50 MG Tb24   TAKE 1 TABLET (50 MG TOTAL) BY MOUTH DAILY. Commonly known as:   Toprol XL           naproxen 500 MG

## (undated) NOTE — LETTER
Mayo Castro, [place]  [address] [facility] [address], [place]       12/08/18        Patient: Marie Bruce   YOB: 1945   Date of Visit: 12/7/2018       Dear  Dr. Miky Trinh MD,      Thank you for referring Marie Bruce to my pract

## (undated) NOTE — MR AVS SNAPSHOT
[address] [facility] [id number] [phone]               Thank you for choosing us for your health care visit with Humza Lester MD.  We are glad to serve you and happy to provide you with Fax:  [phone]    Diagnoses:  Herniated lumbar intervertebral disc   Acute low back pain without sciatica, unspecified back pain laterality   Order:  Physical Therapy - Internal        Comment:  Treatment: Evaluate & Treat    Physician goals: Pain reli company's guidelines. Unauthorized care may be your financial responsibility. If you have questions, please call your plans customer service number located on your ID card.     To schedule Physical Therapy at any of the [facility] What changed:  Another medication with the same name was removed. Continue taking this medication, and follow the directions you see here.    Commonly known as:  NORCO           ibuprofen 600 MG Tabs   Take 1 tablet (600 mg total) by mouth every 6 (six) enmanuel

## (undated) NOTE — MR AVS SNAPSHOT
[address]  [address]  [phone]               Thank you for choosing us for your health care visit with David Myers DPM.  We are glad to serve you and happy to provide yo procedure. IF A CHILD is scheduled for this procedure, parents should be advised that they will not be able to accompany the child in the testing room.  Parents will remain in the MRI waiting area and be reunited with the child upon completion of the MR Take 1 tablet (50 mg total) by mouth daily. Commonly known as:  TOPROL XL           Sertraline HCl 50 MG Tabs   Take 1 tablet (50 mg total) by mouth daily.    Commonly known as:  [address]                  [facility]

## (undated) NOTE — LETTER
AUTHORIZATION FOR SURGICAL OPERATION OR OTHER PROCEDURE    1.  I hereby authorize Dr. Nhan Toney and the [facility] staff assigned to my case to perform the following operation and/or procedure at the [facility]:    Left lipoma corticosteroid injection (dercum Relationship to Patient:             Parent    Responsible person                            Spouse  In case of minor or                     Other  _____________   Incompetent name:  __________________________________________________

## (undated) NOTE — MR AVS SNAPSHOT
[address] [address]  [phone]               Thank you for choosing us for your health care visit with Nolan Kim MD.  We are glad to serve you and happy to provide you with What changed:  Another medication with the same name was added. Make sure you understand how and when to take each.    Commonly known as:  NORCO           * hydrocodone-acetaminophen 7.5-325 MG Tabs   Take 1 tablet by mouth every 6 (six) hours as needed for TAKE 1 TABLET (50 MG TOTAL) BY MOUTH DAILY. What changed:  See the new instructions. Commonly known as:  ZOLOFT           triamcinolone acetonide 0.1 % Crea   Apply topically 2 (two) times daily as needed.    Commonly known as:  KENALOG           * Noti Start activities slowly and build up over time Do what you like   Get your heart pumping – brisk walking, biking, swimming Even 10 minute increments are effective and add up over the week   2 ½ hours per week – spread out over time Use a michelet to keep you

## (undated) NOTE — ED AVS SNAPSHOT
Bill Garcia   MRN: [id number]    Department:  [facility] Emergency Department   Date of Visit:  7/30/2017           Disclosure     Insurance plans vary and the physician(s) referred by the ER may not be covered by your plan.  Please conta CARE PHYSICIAN AT ONCE OR RETURN IMMEDIATELY TO THE EMERGENCY DEPARTMENT. If you have been prescribed any medication(s), please fill your prescription right away and begin taking the medication(s) as directed.   If you believe that any of the medications

## (undated) NOTE — MR AVS SNAPSHOT
[address] [address] [address]  [phone]               Thank you for choosing us for your health care visit with Heatehr Purvis DO.   We are glad to serve you and happy to provide you with this ely What changed: You were already taking a medication with the same name, and this prescription was added. Make sure you understand how and when to take each.    Commonly known as:  NORCO           Levothyroxine Sodium 137 MCG Tabs   TAKE 1 TABLET BY MOUTH BE information, go to [email or web address] and click on the Sign Up Now link in the [employer] box. Enter your [employer] Activation Code exactly as it appears below along with your Zip Code and Date of Birth to complete the sign-up process.  If you do

## (undated) NOTE — MR AVS SNAPSHOT
[address]  [address]  [phone]               Thank you for choosing us for your health care visit with Martina Mcnulty DPM.  We are glad to serve you and happy to provide yo Commonly known as:  SYNTHROID, LEVOTHROID           lisinopril 20 MG Tabs   Take 1 tablet (20 mg total) by mouth daily.    Commonly known as:  PRINIVIL,ZESTRIL           LORazepam 2 MG Tabs   Take 1 tablet (2 mg total) by mouth 2 (two) times daily as needed

## (undated) NOTE — MR AVS SNAPSHOT
[address]  [address]  [phone]               Thank you for choosing us for your health care visit with Dominica Hashimoto, DPM.  We are glad to serve you and happy to provide yo Levothyroxine Sodium 137 MCG Tabs   TAKE 1 TABLET BY MOUTH BEFORE BREAKFAST. Commonly known as:  SYNTHROID, LEVOTHROID           lisinopril 20 MG Tabs   Take 1 tablet (20 mg total) by mouth daily.    Commonly known as:  PRINIVIL,ZESTRIL           [address] [address]     Phone:  [phone]    - Clotrimazole 1 % Oint  - Sertraline HCl 50 MG Tabs  - triamcinolone acetonide 0.1 % Crea      You can get these medications from any pharmacy     Bring a paper prescription for each of these medications    - LORazepam 2

## (undated) NOTE — ED AVS SNAPSHOT
Rere Farmer   MRN: [id number]    Department:  [facility] Emergency Department   Date of Visit:  9/5/2018           Disclosure     Insurance plans vary and the physician(s) referred by the ER may not be covered by your plan.  Please contac within the next three months to obtain basic health screening including reassessment of your blood pressure.     IF THERE IS ANY CHANGE OR WORSENING OF YOUR CONDITION, CALL YOUR PRIMARY CARE PHYSICIAN AT ONCE OR RETURN IMMEDIATELY TO THE EMERGENCY DEPARTMEN

## (undated) NOTE — MR AVS SNAPSHOT
[address] [facility] [address] [id number] [phone]               Thank you for choosing us for your health care visit with Hesham Herring MD.  We are glad to serve you and happy to provide you with Fax:  [phone]    Diagnoses:  Herniated intervertebral disc of lumbar spine   Chronic left-sided low back pain with left-sided sciatica   Order:  Physiatry - Internal    [address]   [address] [id number]         Referral O company's guidelines. Unauthorized care may be your financial responsibility. If you have questions, please call your plans customer service number located on your ID card.     To schedule Physical Therapy at any of the [facility] Commonly known as:  [facility]CO           Levothyroxine Sodium 137 MCG Tabs   TAKE 1 TABLET BY MOUTH BEFORE BREAKFAST. Commonly known as:  SYNTHROID, LEVOTHROID           lisinopril 20 MG Tabs   Take 1 tablet (20 mg total) by mouth daily.    Commonly known as: [address] [phone], [phone]  [address], [address]     Phone:  [phone]    - Clotrimazole 1 % Oint  - Sertraline HCl 50 MG Tabs  - triamcinolone acetonide 0.1 % Crea      You can get these medications from any pharmacy     [place]